# Patient Record
Sex: MALE | Race: WHITE
[De-identification: names, ages, dates, MRNs, and addresses within clinical notes are randomized per-mention and may not be internally consistent; named-entity substitution may affect disease eponyms.]

---

## 2023-10-08 ENCOUNTER — HOSPITAL ENCOUNTER (INPATIENT)
Dept: HOSPITAL 101 - ER | Age: 63
LOS: 1 days | Discharge: ANOTHER HOSPITAL | DRG: 208 | End: 2023-10-09
Payer: COMMERCIAL

## 2023-10-08 VITALS — OXYGEN SATURATION: 67 %

## 2023-10-08 VITALS — HEART RATE: 106 BPM | RESPIRATION RATE: 42 BRPM | OXYGEN SATURATION: 90 %

## 2023-10-08 VITALS — RESPIRATION RATE: 19 BRPM | HEART RATE: 103 BPM | OXYGEN SATURATION: 93 %

## 2023-10-08 VITALS
DIASTOLIC BLOOD PRESSURE: 88 MMHG | SYSTOLIC BLOOD PRESSURE: 143 MMHG | HEART RATE: 91 BPM | OXYGEN SATURATION: 89 % | RESPIRATION RATE: 27 BRPM

## 2023-10-08 VITALS — OXYGEN SATURATION: 87 % | RESPIRATION RATE: 29 BRPM | HEART RATE: 93 BPM

## 2023-10-08 VITALS — HEART RATE: 109 BPM | RESPIRATION RATE: 14 BRPM | OXYGEN SATURATION: 90 %

## 2023-10-08 VITALS
OXYGEN SATURATION: 92 % | DIASTOLIC BLOOD PRESSURE: 109 MMHG | RESPIRATION RATE: 22 BRPM | HEART RATE: 107 BPM | SYSTOLIC BLOOD PRESSURE: 160 MMHG

## 2023-10-08 VITALS
HEART RATE: 105 BPM | DIASTOLIC BLOOD PRESSURE: 128 MMHG | OXYGEN SATURATION: 95 % | SYSTOLIC BLOOD PRESSURE: 172 MMHG | RESPIRATION RATE: 28 BRPM

## 2023-10-08 VITALS — HEART RATE: 88 BPM | OXYGEN SATURATION: 91 %

## 2023-10-08 VITALS — HEART RATE: 92 BPM | OXYGEN SATURATION: 91 % | RESPIRATION RATE: 16 BRPM

## 2023-10-08 VITALS — HEART RATE: 102 BPM | RESPIRATION RATE: 42 BRPM

## 2023-10-08 VITALS
SYSTOLIC BLOOD PRESSURE: 181 MMHG | RESPIRATION RATE: 13 BRPM | HEART RATE: 110 BPM | DIASTOLIC BLOOD PRESSURE: 123 MMHG | OXYGEN SATURATION: 94 %

## 2023-10-08 VITALS — HEART RATE: 109 BPM | RESPIRATION RATE: 21 BRPM | OXYGEN SATURATION: 97 %

## 2023-10-08 VITALS — OXYGEN SATURATION: 98 % | HEART RATE: 112 BPM | RESPIRATION RATE: 19 BRPM

## 2023-10-08 VITALS — SYSTOLIC BLOOD PRESSURE: 184 MMHG | HEART RATE: 109 BPM | DIASTOLIC BLOOD PRESSURE: 118 MMHG | RESPIRATION RATE: 9 BRPM

## 2023-10-08 VITALS — RESPIRATION RATE: 17 BRPM | OXYGEN SATURATION: 89 % | HEART RATE: 107 BPM

## 2023-10-08 VITALS — HEART RATE: 107 BPM | RESPIRATION RATE: 13 BRPM

## 2023-10-08 VITALS — OXYGEN SATURATION: 91 % | RESPIRATION RATE: 14 BRPM | HEART RATE: 106 BPM

## 2023-10-08 VITALS — RESPIRATION RATE: 27 BRPM | HEART RATE: 95 BPM | OXYGEN SATURATION: 91 %

## 2023-10-08 VITALS
DIASTOLIC BLOOD PRESSURE: 105 MMHG | RESPIRATION RATE: 12 BRPM | OXYGEN SATURATION: 94 % | HEART RATE: 100 BPM | SYSTOLIC BLOOD PRESSURE: 146 MMHG

## 2023-10-08 VITALS — OXYGEN SATURATION: 91 %

## 2023-10-08 VITALS — HEART RATE: 106 BPM | RESPIRATION RATE: 26 BRPM | OXYGEN SATURATION: 89 %

## 2023-10-08 VITALS — HEART RATE: 98 BPM | RESPIRATION RATE: 22 BRPM | OXYGEN SATURATION: 95 %

## 2023-10-08 VITALS
RESPIRATION RATE: 40 BRPM | SYSTOLIC BLOOD PRESSURE: 179 MMHG | OXYGEN SATURATION: 97 % | HEART RATE: 108 BPM | DIASTOLIC BLOOD PRESSURE: 123 MMHG

## 2023-10-08 VITALS
DIASTOLIC BLOOD PRESSURE: 108 MMHG | SYSTOLIC BLOOD PRESSURE: 178 MMHG | HEART RATE: 107 BPM | RESPIRATION RATE: 15 BRPM | OXYGEN SATURATION: 80 %

## 2023-10-08 VITALS — RESPIRATION RATE: 46 BRPM | HEART RATE: 108 BPM | OXYGEN SATURATION: 91 %

## 2023-10-08 VITALS — RESPIRATION RATE: 42 BRPM | HEART RATE: 105 BPM

## 2023-10-08 VITALS — RESPIRATION RATE: 24 BRPM | OXYGEN SATURATION: 90 % | HEART RATE: 94 BPM

## 2023-10-08 VITALS
OXYGEN SATURATION: 91 % | SYSTOLIC BLOOD PRESSURE: 156 MMHG | HEART RATE: 111 BPM | RESPIRATION RATE: 36 BRPM | DIASTOLIC BLOOD PRESSURE: 106 MMHG

## 2023-10-08 VITALS — OXYGEN SATURATION: 91 % | HEART RATE: 109 BPM

## 2023-10-08 VITALS — HEART RATE: 95 BPM | OXYGEN SATURATION: 95 %

## 2023-10-08 VITALS — HEART RATE: 109 BPM | RESPIRATION RATE: 32 BRPM

## 2023-10-08 VITALS — HEART RATE: 90 BPM | OXYGEN SATURATION: 90 % | RESPIRATION RATE: 28 BRPM

## 2023-10-08 VITALS
DIASTOLIC BLOOD PRESSURE: 98 MMHG | OXYGEN SATURATION: 84 % | RESPIRATION RATE: 50 BRPM | SYSTOLIC BLOOD PRESSURE: 163 MMHG | HEART RATE: 106 BPM

## 2023-10-08 VITALS — SYSTOLIC BLOOD PRESSURE: 149 MMHG | OXYGEN SATURATION: 69 % | DIASTOLIC BLOOD PRESSURE: 106 MMHG

## 2023-10-08 VITALS
DIASTOLIC BLOOD PRESSURE: 99 MMHG | HEART RATE: 94 BPM | SYSTOLIC BLOOD PRESSURE: 157 MMHG | TEMPERATURE: 98.7 F | RESPIRATION RATE: 14 BRPM | OXYGEN SATURATION: 96 %

## 2023-10-08 VITALS
SYSTOLIC BLOOD PRESSURE: 176 MMHG | OXYGEN SATURATION: 89 % | HEART RATE: 104 BPM | RESPIRATION RATE: 29 BRPM | DIASTOLIC BLOOD PRESSURE: 111 MMHG

## 2023-10-08 VITALS — OXYGEN SATURATION: 88 % | RESPIRATION RATE: 47 BRPM | HEART RATE: 109 BPM

## 2023-10-08 VITALS — HEART RATE: 103 BPM | RESPIRATION RATE: 24 BRPM

## 2023-10-08 VITALS
RESPIRATION RATE: 44 BRPM | HEART RATE: 108 BPM | SYSTOLIC BLOOD PRESSURE: 161 MMHG | DIASTOLIC BLOOD PRESSURE: 115 MMHG | OXYGEN SATURATION: 83 %

## 2023-10-08 VITALS — DIASTOLIC BLOOD PRESSURE: 108 MMHG | RESPIRATION RATE: 29 BRPM | HEART RATE: 104 BPM | SYSTOLIC BLOOD PRESSURE: 179 MMHG

## 2023-10-08 VITALS — HEART RATE: 109 BPM | RESPIRATION RATE: 10 BRPM | OXYGEN SATURATION: 82 %

## 2023-10-08 VITALS
SYSTOLIC BLOOD PRESSURE: 135 MMHG | OXYGEN SATURATION: 89 % | RESPIRATION RATE: 27 BRPM | HEART RATE: 94 BPM | DIASTOLIC BLOOD PRESSURE: 88 MMHG

## 2023-10-08 VITALS
OXYGEN SATURATION: 89 % | RESPIRATION RATE: 30 BRPM | SYSTOLIC BLOOD PRESSURE: 177 MMHG | DIASTOLIC BLOOD PRESSURE: 108 MMHG | HEART RATE: 107 BPM

## 2023-10-08 VITALS — OXYGEN SATURATION: 89 % | RESPIRATION RATE: 30 BRPM | HEART RATE: 91 BPM

## 2023-10-08 VITALS — HEART RATE: 89 BPM | OXYGEN SATURATION: 91 % | RESPIRATION RATE: 24 BRPM

## 2023-10-08 VITALS — OXYGEN SATURATION: 90 % | HEART RATE: 107 BPM | RESPIRATION RATE: 30 BRPM

## 2023-10-08 VITALS — HEART RATE: 104 BPM | OXYGEN SATURATION: 93 % | RESPIRATION RATE: 16 BRPM

## 2023-10-08 VITALS
SYSTOLIC BLOOD PRESSURE: 173 MMHG | HEART RATE: 107 BPM | OXYGEN SATURATION: 92 % | RESPIRATION RATE: 23 BRPM | DIASTOLIC BLOOD PRESSURE: 107 MMHG

## 2023-10-08 VITALS
SYSTOLIC BLOOD PRESSURE: 149 MMHG | DIASTOLIC BLOOD PRESSURE: 106 MMHG | OXYGEN SATURATION: 68 % | RESPIRATION RATE: 20 BRPM | HEART RATE: 114 BPM

## 2023-10-08 VITALS
DIASTOLIC BLOOD PRESSURE: 119 MMHG | SYSTOLIC BLOOD PRESSURE: 174 MMHG | RESPIRATION RATE: 36 BRPM | HEART RATE: 107 BPM | OXYGEN SATURATION: 89 %

## 2023-10-08 VITALS — RESPIRATION RATE: 12 BRPM | OXYGEN SATURATION: 95 % | HEART RATE: 96 BPM

## 2023-10-08 VITALS
OXYGEN SATURATION: 92 % | RESPIRATION RATE: 27 BRPM | DIASTOLIC BLOOD PRESSURE: 102 MMHG | SYSTOLIC BLOOD PRESSURE: 151 MMHG | HEART RATE: 108 BPM

## 2023-10-08 VITALS
OXYGEN SATURATION: 92 % | RESPIRATION RATE: 32 BRPM | DIASTOLIC BLOOD PRESSURE: 121 MMHG | HEART RATE: 109 BPM | SYSTOLIC BLOOD PRESSURE: 189 MMHG

## 2023-10-08 VITALS — OXYGEN SATURATION: 68 %

## 2023-10-08 VITALS — RESPIRATION RATE: 32 BRPM | OXYGEN SATURATION: 93 % | HEART RATE: 107 BPM

## 2023-10-08 VITALS — RESPIRATION RATE: 27 BRPM | OXYGEN SATURATION: 92 % | HEART RATE: 105 BPM

## 2023-10-08 VITALS — DIASTOLIC BLOOD PRESSURE: 102 MMHG | SYSTOLIC BLOOD PRESSURE: 166 MMHG | RESPIRATION RATE: 32 BRPM | HEART RATE: 103 BPM

## 2023-10-08 VITALS — RESPIRATION RATE: 32 BRPM | HEART RATE: 92 BPM | OXYGEN SATURATION: 89 %

## 2023-10-08 VITALS — BODY MASS INDEX: 31.2 KG/M2 | BODY MASS INDEX: 31.6 KG/M2

## 2023-10-08 VITALS
RESPIRATION RATE: 16 BRPM | DIASTOLIC BLOOD PRESSURE: 111 MMHG | HEART RATE: 107 BPM | SYSTOLIC BLOOD PRESSURE: 160 MMHG | OXYGEN SATURATION: 89 %

## 2023-10-08 VITALS
SYSTOLIC BLOOD PRESSURE: 169 MMHG | HEART RATE: 105 BPM | RESPIRATION RATE: 35 BRPM | DIASTOLIC BLOOD PRESSURE: 104 MMHG | OXYGEN SATURATION: 85 %

## 2023-10-08 DIAGNOSIS — J44.1: Primary | ICD-10-CM

## 2023-10-08 DIAGNOSIS — Z88.0: ICD-10-CM

## 2023-10-08 DIAGNOSIS — F17.200: ICD-10-CM

## 2023-10-08 DIAGNOSIS — J96.02: ICD-10-CM

## 2023-10-08 DIAGNOSIS — E87.1: ICD-10-CM

## 2023-10-08 DIAGNOSIS — J96.91: ICD-10-CM

## 2023-10-08 DIAGNOSIS — R79.89: ICD-10-CM

## 2023-10-08 LAB
ADD MANUAL DIFF: NO
ALANINE AMINOTRANSFERASE: 66 U/L (ref 16–63)
ALBUMIN GLOBULIN RATIO: 0.9
ALBUMIN LEVEL: 3.7 G/DL (ref 3.4–5)
ALKALINE PHOSPHATASE: 74 U/L (ref 46–116)
ALLEN TEST: POSITIVE
ANION GAP: 8.6
ASPARTATE AMINO TRANSFERASE: 64 U/L (ref 15–37)
BLOOD UREA NITROGEN: 33 MG/DL (ref 7–18)
CALCIUM: 8.5 MG/DL (ref 8.5–10.1)
CARBON DIOXIDE: 33.2 MMOL/L (ref 21–32)
CAST SEEN?: (no result) #/LPF
CHLORIDE: 84 MMOL/L (ref 98–107)
CO2 BLD-SCNC: 33.2 MMOL/L (ref 21–32)
CO2 SERPLBLD-SCNC: 88.4 MMHG (ref 35–45)
ESTIMATED GFR (AFRICAN AMERICA: >60 (ref 60–?)
ESTIMATED GFR (NON-AFRICAN AME: 59 (ref 60–?)
GAS PNL BLDA: 88.4 MMHG (ref 35–45)
GLOBULIN: 4 G/DL
GLUCOSE BLD-MCNC: 128 MG/DL (ref 74–106)
GLUCOSE URINE UA: NEGATIVE MG/DL
HCO3 ABG: 33.9 MMOL/L (ref 22–26)
HCO3 STD BLDA-SCNC: 33.9 MMOL/L (ref 22–26)
HCT VFR BLD CALC: 57.4 % (ref 42–54)
HEMATOCRIT: 57.4 % (ref 42–54)
HEMOGLOBIN: 18.6 G/DL (ref 14–18)
IMMATURE GRANULOCYTES ABS AUTO: 0.02 10^3/UL (ref 0–0.03)
IMMATURE GRANULOCYTES PCT AUTO: 0.4 % (ref 0–0.5)
INR: 1.14
LACTATE/LACTIC ACID: 1.2 MMOL/L (ref 0.4–2)
LACTATE/LACTIC ACID: 3 MMOL/L (ref 0.4–2)
LITERS PER MINUTE: 4
LYMPHOCYTES  ABSOLUTE AUTO: 1 10^3/UL (ref 1.2–3.8)
MCV RBC: 100.9 FL (ref 80–94)
MEAN CORPUSCULAR HEMOGLOBIN: 32.7 PG (ref 25.9–34)
MEAN CORPUSCULAR HGB CONC: 32.4 G/DL (ref 29.9–35.2)
MEAN CORPUSCULAR VOLUME: 100.9 FL (ref 80–94)
O2 MODE: (no result)
PARTIAL THROMBOPLASTIN TIME: 30.9 SEC (ref 22.3–36.2)
PLATELET # BLD: 210 10^3/UL (ref 150–450)
PLATELET COUNT: 210 10^3/UL (ref 150–450)
PO2 BLDA: 93.1 MMHG (ref 80–100)
POTASSIUM SERPLBLD-SCNC: 4.8 MMOL/L (ref 3.5–5.1)
POTASSIUM: 4.8 MMOL/L (ref 3.5–5.1)
PROTHROMBIN TIME: 12 SEC (ref 9–11.6)
PUNCTURE SITE: (no result)
RED BLOOD COUNT: 5.69 10^6/UL (ref 4.7–6.1)
SAO2 % BLDA: 96 %
SARS-COV-2 AG: NEGATIVE
SODIUM BLD-SCNC: 121 MMOL/L (ref 136–145)
SODIUM: 121 MMOL/L (ref 136–145)
TOTAL PROTEIN: 7.7 G/DL (ref 6.4–8.2)
URINE CULTURE INDICATED: YES
WBC # BLD: 5.6 10^3/UL (ref 4–11)
WHITE BLOOD COUNT: 5.6 10^3/UL (ref 4–11)

## 2023-10-08 PROCEDURE — 94002 VENT MGMT INPAT INIT DAY: CPT

## 2023-10-08 PROCEDURE — 87804 INFLUENZA ASSAY W/OPTIC: CPT

## 2023-10-08 PROCEDURE — 36415 COLL VENOUS BLD VENIPUNCTURE: CPT

## 2023-10-08 PROCEDURE — 81001 URINALYSIS AUTO W/SCOPE: CPT

## 2023-10-08 PROCEDURE — 93005 ELECTROCARDIOGRAM TRACING: CPT

## 2023-10-08 PROCEDURE — 87086 URINE CULTURE/COLONY COUNT: CPT

## 2023-10-08 PROCEDURE — 87205 SMEAR GRAM STAIN: CPT

## 2023-10-08 PROCEDURE — 96375 TX/PRO/DX INJ NEW DRUG ADDON: CPT

## 2023-10-08 PROCEDURE — 96366 THER/PROPH/DIAG IV INF ADDON: CPT

## 2023-10-08 PROCEDURE — 84484 ASSAY OF TROPONIN QUANT: CPT

## 2023-10-08 PROCEDURE — 87635 SARS-COV-2 COVID-19 AMP PRB: CPT

## 2023-10-08 PROCEDURE — 96376 TX/PRO/DX INJ SAME DRUG ADON: CPT

## 2023-10-08 PROCEDURE — 87811 SARS-COV-2 COVID19 W/OPTIC: CPT

## 2023-10-08 PROCEDURE — 71045 X-RAY EXAM CHEST 1 VIEW: CPT

## 2023-10-08 PROCEDURE — U0003 INFECTIOUS AGENT DETECTION BY NUCLEIC ACID (DNA OR RNA); SEVERE ACUTE RESPIRATORY SYNDROME CORONAVIRUS 2 (SARS-COV-2) (CORONAVIRUS DISEASE [COVID-19]), AMPLIFIED PROBE TECHNIQUE, MAKING USE OF HIGH THROUGHPUT TECHNOLOGIES AS DESCRIBED BY CMS-2020-01-R: HCPCS

## 2023-10-08 PROCEDURE — 94640 AIRWAY INHALATION TREATMENT: CPT

## 2023-10-08 PROCEDURE — 84300 ASSAY OF URINE SODIUM: CPT

## 2023-10-08 PROCEDURE — 80048 BASIC METABOLIC PNL TOTAL CA: CPT

## 2023-10-08 PROCEDURE — 83605 ASSAY OF LACTIC ACID: CPT

## 2023-10-08 PROCEDURE — 51702 INSERT TEMP BLADDER CATH: CPT

## 2023-10-08 PROCEDURE — 83880 ASSAY OF NATRIURETIC PEPTIDE: CPT

## 2023-10-08 PROCEDURE — 83935 ASSAY OF URINE OSMOLALITY: CPT

## 2023-10-08 PROCEDURE — 82805 BLOOD GASES W/O2 SATURATION: CPT

## 2023-10-08 PROCEDURE — 96365 THER/PROPH/DIAG IV INF INIT: CPT

## 2023-10-08 PROCEDURE — 85378 FIBRIN DEGRADE SEMIQUANT: CPT

## 2023-10-08 PROCEDURE — 85610 PROTHROMBIN TIME: CPT

## 2023-10-08 PROCEDURE — 71275 CT ANGIOGRAPHY CHEST: CPT

## 2023-10-08 PROCEDURE — 36600 WITHDRAWAL OF ARTERIAL BLOOD: CPT

## 2023-10-08 PROCEDURE — 87040 BLOOD CULTURE FOR BACTERIA: CPT

## 2023-10-08 PROCEDURE — 85025 COMPLETE CBC W/AUTO DIFF WBC: CPT

## 2023-10-08 PROCEDURE — 94660 CPAP INITIATION&MGMT: CPT

## 2023-10-08 PROCEDURE — 83930 ASSAY OF BLOOD OSMOLALITY: CPT

## 2023-10-08 PROCEDURE — 96368 THER/DIAG CONCURRENT INF: CPT

## 2023-10-08 PROCEDURE — 87070 CULTURE OTHR SPECIMN AEROBIC: CPT

## 2023-10-08 PROCEDURE — 80053 COMPREHEN METABOLIC PANEL: CPT

## 2023-10-08 PROCEDURE — 99285 EMERGENCY DEPT VISIT HI MDM: CPT

## 2023-10-08 PROCEDURE — 85730 THROMBOPLASTIN TIME PARTIAL: CPT

## 2023-10-08 PROCEDURE — 84295 ASSAY OF SERUM SODIUM: CPT

## 2023-10-08 PROCEDURE — 96367 TX/PROPH/DG ADDL SEQ IV INF: CPT

## 2023-10-08 RX ADMIN — LORAZEPAM 0.5 MG: 2 INJECTION INTRAMUSCULAR; INTRAVENOUS at 21:15

## 2023-10-08 RX ADMIN — AZITHROMYCIN 250 MG: 500 INJECTION, POWDER, LYOPHILIZED, FOR SOLUTION INTRAVENOUS at 19:32

## 2023-10-08 RX ADMIN — FLUMAZENIL 0.5 MG: 0.1 INJECTION, SOLUTION INTRAVENOUS at 22:32

## 2023-10-08 RX ADMIN — SODIUM CHLORIDE 999 ML: 900 INJECTION, SOLUTION INTRAVENOUS at 18:35

## 2023-10-08 RX ADMIN — ALBUTEROL SULFATE 2.5 MG: 2.5 SOLUTION RESPIRATORY (INHALATION) at 17:37

## 2023-10-08 RX ADMIN — IPRATROPIUM BROMIDE AND ALBUTEROL SULFATE 3 ML: .5; 2.5 SOLUTION RESPIRATORY (INHALATION) at 17:37

## 2023-10-08 RX ADMIN — Medication 1 MG: at 19:15

## 2023-10-08 RX ADMIN — METHYLPREDNISOLONE SODIUM SUCCINATE 125 MG: 125 INJECTION, POWDER, FOR SOLUTION INTRAMUSCULAR; INTRAVENOUS at 17:47

## 2023-10-08 NOTE — CT_ITS
The [address] [address] 
     [phone] 
  
  
Patient Name: 
EDGAR REYES 
  
MRN: [id number]    YOB: 1960    Sex: M 
Assigned Patient Location: ER 
Current Patient Location:  
Accession/Order Number: [id number] 
Exam Date: 10/08/2023  19:58    Report Date: 10/08/2023  21:29 
  
At the request of: 
RIVAS RODRIGEZ   
  
Procedure:  CT angio chest 
  
EXAM: CT angio chest 
  
TECHNIQUE: CT angiogram with contrast performed of the chest including multi  
planar reformatted images and maximum intensity projection images. 3-D volume  
rendering created. Dose reduction techniques were achieved by using automated  
exposure control and/or adjustment of mA and/or kV according to patient size  
and/or use of iterative reconstruction technique.  
  
HISTORY: r/o pE elevated d-dimer  
  
COMPARISON: None.  
  
______________________ 
  
FINDINGS: 
  
Neck and Axilla: No lower neck or axillary lymphadenopathy. 
  
Mediastinum and [place]: No hilar or mediastinal lymphadenopathy. The esophagus  
is  
grossly unremarkable without dilatation or gross mass lesion. 
  
Heart and Major Vessels: The heart appears unremarkable for size without  
pericardial effusion. The aorta and central pulmonary arteries are  
unremarkable  
for size.Evaluation of the pulmonary embolism is limited by breathing motion  
artifact. There is no convincing evidence for acute pulmonary embolism. 
  
Lung Fields: The lungs are clear of acute infiltrate or mass. 
  
Pleural Spaces: No significant pleural effusion. No pneumothorax. 
  
Upper Abdomen: No acute abnormality identified. 
  
Chest Wall: No acute abnormality. 
  
_________________________ 
  
ORDER #: [id number] CT/CT angio chest  
IMPRESSION:   
   
No evidence for acute pulmonary embolism. Evaluation somewhat limited for   
distal pulmonary arteries due to motion artifact.  
   
No acute pulmonary consolidation  
   
   
Electronically authenticated by: NAS GARZA   Date: 10/08/2023  21:29

## 2023-10-08 NOTE — ED.SOB1
HPI - SOB/Dyspnea
General
Chief Complaint: Shortness of Breath/Dyspnea
Stated Complaint: SOB
Time Seen by Provider: 10/08/23 17:05
Source: patient
Mode of arrival: Wheelchair
Limitations: no limitations
History of Present Illness
HPI Narrative: 
patient is a 62-year-old male presents to the Emergency Room with his daughter and brother for evaluation of shortness of breath. Patient recently retired, had a family member die eight weeks ago unexpectedly and has been noticing a decline in his 
health. Patient reports a 30 pound weight loss, increased shortness of breath with activity. He was previously on a inhaler for chronic obstructive pulmonary disease but has been out of this for some time and still smokes more than one pack per day. 
Patient presents with profound fatigue and shortness of breath on exertion. He denies any current pain or discomfort. Daughter at bedside notes some confusion and home pulse oximetry read very low compared  to her own which required a lot of talking 
the patient into coming in being seen.

patient appears hypoxic with increased work of breathing, dusky cool skin..patient admits to having some sinus congestion within the past few weeks.
MD elicited complaint: shortness of breath and cough
Pertinent past history: COPD
Context: recent illness
Severity: mild
Exacerbating factors: movement (any activity) and stress
Relieving factors: nothing
Known history of: COPD
Related Data
Home Medications

 Medication  Instructions  Recorded  Confirmed
No Known Home Medications  10/08/23 10/08/23


Allergies

Allergy/AdvReac Type Severity Reaction Status Date / Time
Penicillins Allergy Unknown  Verified 10/08/23 17:25



Review of Systems
ROS  
 Constitutional Reports: change in weight and fatigue; Denies: fever or chills   
 Eyes Denies: change in vision, blurry vision, blind spots or light sensitivity   
 Ears, nose, mouth, and throat Denies: throat pain or neck pain   
 Cardiovascular Reports: edema, shortness of breath when lying down and bluish discoloration of hands/feet; Denies: chest pain or palpitations   
 Respiratory Reports: shortness of breath, cough and wheezing; Denies: pain on inspiration   
 Gastrointestinal Denies: abdominal pain, nausea or vomiting   
 Musculoskeletal Denies: back pain or neck pain   
 Integumentary/Breast Denies: rash or itching   
 Neurological Reports: confusion; Denies: headache   
 Psychiatric Denies: anxiety or mood swings   
 Endocrine Denies: excessive urination   

[facility]
Social History (Updated 10/08/23 @ 23:52 by Lesa Mcarthur)
Smoking status:  Heavy tobacco smoker 
Do you think of yourself as:  straight/heterosexual 
Gender Identity:  male 



Exam
Narrative
Exam Narrative: 
Nurses notes and vital signs reviewed and patient is hypoxic

General:  The patient appears well and in no apparent distress.  Patient is resting comfortably on cart.
Skin:  cool pale dusky appearance of skin, bluish tint to hands and feet and nose with telangectasias and notable tobacco staining to his mustache
Head:  Normocephalic, atraumatic
Neck:  Supple, trachea mid-line, no tenderness, no lymphadenopathy
Eye:  Pupils are equal, round and reactive to light, EOMI
Ears, Nose, Mouth, and Throat:  TM are clear, normal light reflex, oral mucosa is moist, no posterior oropharynx erythema or hypertrophy, uvula is mid-line
Cardiovascular:  tachycardic
Respiratory: no accessory muscle use diffuse expiratory and has notable inspiratory and expiratory wheezing.
Chest Wall:  no tenderness
Back:  non-tender, no CVA tenderness
Musculoskeletal:  normal ROM, no tenderness, minimal edema in the lower legs.
GI:  Normal bowel sounds, no tenderness to palpation, no masses appreciated.  No rebound, guarding, or rigidity noted.
Neurological:  A&O x4, recalls recent history of caring for family and death of family member 8 wks ago.  
Psychiatric:  Cooperative
Constitutional
Vital Signs, click to edit/add: 

Last Vital Signs

Temp  98.7 F   10/08/23 23:02
Pulse  72   10/09/23 04:30
Resp  16   10/09/23 04:30
BP  110/79   10/09/23 04:30
Pulse Ox  94 L  10/09/23 04:30
O2 Del Method  BIPAP  10/08/23 23:13
O2 Flow Rate  4   10/08/23 17:39
FiO2  40   10/09/23 04:30




Course
Vital Signs
Vital signs: 

Vital Signs

Pulse Rate  114 H  10/08/23 17:08
Respiratory Rate  20   10/08/23 17:08
Blood Pressure  149/106 H  10/08/23 17:08
Pulse Oximetry  68 L  10/08/23 17:08
Oxygen Delivery Method  Room Air  10/08/23 17:08



Temperature  98.7 F   10/08/23 23:02
Pulse Rate  72   10/09/23 04:30
Respiratory Rate  16   10/09/23 04:30
Blood Pressure  110/79   10/09/23 04:30
Pulse Oximetry  94 L  10/09/23 04:30
Oxygen Delivery Method  BIPAP  10/08/23 23:13
Oxygen Delivery Flow Rate  4   10/08/23 17:39
Fraction of Inspired Oxygen  40   10/09/23 04:30




MDM - SOB/Dyspnea
MDM Narrative
Medical decision making narrative: 
clinical presentation concerning for acute on chronic chronic obstructive pulmonary disease exacerbation. Patient not taking home inhaler, chronic smoking abuse, patient has had significant weight loss. Since retiring patient's health has 
significantly declined... Patient given DuoNeb and albuterol. Solu-Medrol 125 mg IV. Patient placed on oxygen 4 L/m nasal cannula. Patient noting improvement in his breathing after treatments. Pulse oximetry stayed at 90%, compared to 64% on 
arrival. Patient had chest x-ray without evidence of infiltrate or mass. We discussed his laboratory studies noted for ABG pH is 7.193, elevated lactic 3.0, PCO2 was 80.4. He was hyponatremic at one twenty-one, and troponin was elevated at 149.8 
along with BNP of eleven thousand four twenty-five. Patient does not appear to be fluid retaining and was given a 1 L IV fluid bolus for his hyponatremia. Patient adamant about not being admitted to the hospital, bedside conversation with Dr. cabrera 
discussing today's findings with concern of death if patient signs out against medical advice. The patient agrees to stay at this hospital facility but would not accept a transfer to any other facility at this time. He declines transfer to tertiary 
facility. This case will be discussed with the hospitalist for admission and at some point likely pulmonary consult given the chronicity chronic obstructive pulmonary disease, untreated and tobacco abuse contributing to his symptoms with acute 
exacerbation.

Hospitalist paged by Dr. Cabrera, Dr. Thurman will discuss admission
Lab Data
Attestation: I reviewed the patient's lab results.
Labs: 

Lab Results

  10/08/23 10/08/23 10/08/23 Range/Units
  17:25 17:40 17:58 
WBC  5.6    (4.0-11.0)  10^3/uL
RBC  5.69    (4.70-6.10)  10^6/uL
Hgb  18.6 H    (14.0-18.0)  g/dL
Hct  57.4 H    (42.0-54.0)  %
MCV  100.9 H    (80.0-94.0)  fL
MCH  32.7    (25.9-34.0)  pg
MCHC  32.4    (29.9-35.2)  g/dL
RDW  13.3    (11.0-15.0)  %
Plt Count  210    (150-450)  10^3/uL
MPV  9.9    (9.5-13.5)  fL
Neut % (Auto)  65.1    (43.0-75.0)  %
Lymph % (Auto)  17.1 L    (20.5-60.0)  %
Mono % (Auto)  14.2 H    (1.7-12.0)  %
Eos % (Auto)  2.7    (0.9-7.0)  %
Baso % (Auto)  0.5    (0.2-2.0)  %
Neut # (Auto)  3.6    (1.4-6.5)  10^3/uL
Lymph # (Auto)  1.0 L    (1.2-3.8)  10^3/uL
Mono # (Auto)  0.8    (0.3-0.8)  10^3/uL
Eos # (Auto)  0.2    (0.0-0.7)  10^3/uL
Baso # (Auto)  0.0    (0.0-0.1)  10^3/uL
Abs Immat Gran (auto)  0.02    (0.00-0.03)  10^3/uL
Imm/Tot Granulo (auto)  0.4    (0.0-0.5)  %
PT  12.0 H    (9.0-11.6)  sec
INR  1.14    
APTT  30.9    (22.3-36.2)  sec
D-Dimer  2.49 H*    (<=0.59)  mg/L FEU
Puncture Site    Rr  
ABG pH    7.193 L*  (7.350-7.450)  
ABG pCO2    88.4 H*  (35.0-45.0)  mmHg
ABG pO2    93.1  (80.0-100.0)  mmHg
ABG HCO3    33.9 H  (22.0-26.0)  mmol/L
ABG O2 Saturation    96.0  %
ABG Base Excess    5.8 H  (-2.0-2.0)  mmol/L
Oliverio Test    Positive  (POSITIVE)  
O2 Liters/Min    4  
Sodium  121 L*    (136-145)  mmol/L
Potassium  4.8    (3.5-5.1)  mmol/L
Chloride  84 L*    ()  mmol/L
Carbon Dioxide  33.2 H    (21.0-32.0)  mmol/L
Anion Gap  8.6    
BUN  33.0 H    (7.0-18.0)  mg/dL
Creatinine  1.25    (0.70-1.30)  mg/dL
Est GFR ( Amer)  >60    (>=60)  
Est GFR (Non-Af Amer)  59 L    (>=60)  
BUN/Creatinine Ratio  26.4    
Glucose  128 H    ()  mg/dL
Lactate  3.0 H*    (0.4-2.0)  mmol/L
Calcium  8.5    (8.5-10.1)  mg/dL
Total Bilirubin  0.9    (0.2-1.0)  mg/dL
AST  64 H    (15-37)  U/L
ALT  66 H    (16-63)  U/L
Alkaline Phosphatase  74    ()  U/L
Troponin I High Sens  149.8 H*    (4.0-76.1)  pg/mL
NT-Pro-B Natriuret Pep  34772.0 H*    (<=900.0)  pg/mL
Total Protein  7.7    (6.4-8.2)  g/dL
Albumin  3.7    (3.4-5.0)  g/dL
Globulin  4.0    g/dL
Albumin/Globulin Ratio  0.9    
Urine Color     (YELLOW)  
Urine Clarity     (CLEAR)  
Urine pH     (5.0-9.0)  
Ur Specific Gravity     (1.005-1.025)  
Urine Protein     (NEG/TRACE)  mg/dL
Urine Glucose (UA)     (NEGATIVE)  mg/dL
Urine Ketones     (NEGATIVE)  mg/dL
Urine Occult Blood     (NEGATIVE)  
Urine Nitrite     (NEGATIVE)  
Urine Bilirubin     (NEGATIVE)  
Urine Urobilinogen     (0.2-1.0)  EU/dL
Ur Leukocyte Esterase     (NEGATIVE)  
Urine RBC     (0-2)  #/HPF
Urine WBC     (NONE SEEN)  #/HPF
Ur Squamous Epith Cells     (NONE/RARE)  #/LPF
Urine Crystals     (None Seen)  #/HPF
Urine Bacteria     (NONE SEEN)  #/HPF
Urine Casts     (NONE SEEN)  #/LPF
Hyaline Casts     
Fine Granular Casts     
Waxy Casts     
Urine Mucus     (NONE SEEN)  
Ur Culture Indicated?     
SARS-CoV-2 (PCR)   Negative   (NEGATIVE)  
Influenza Type A Ag   Negative   
Influenza Type B Ag   Negative   
SARS-CoV-2 RNA (SATINDER)   Not detected   (NOT DETECTE)  
POC Glucose     ()  mg/dL

  10/08/23 10/08/23 10/08/23 Range/Units
  19:20 19:23 20:25 
WBC     (4.0-11.0)  10^3/uL
RBC     (4.70-6.10)  10^6/uL
Hgb     (14.0-18.0)  g/dL
Hct     (42.0-54.0)  %
MCV     (80.0-94.0)  fL
MCH     (25.9-34.0)  pg
MCHC     (29.9-35.2)  g/dL
RDW     (11.0-15.0)  %
Plt Count     (150-450)  10^3/uL
MPV     (9.5-13.5)  fL
Neut % (Auto)     (43.0-75.0)  %
Lymph % (Auto)     (20.5-60.0)  %
Mono % (Auto)     (1.7-12.0)  %
Eos % (Auto)     (0.9-7.0)  %
Baso % (Auto)     (0.2-2.0)  %
Neut # (Auto)     (1.4-6.5)  10^3/uL
Lymph # (Auto)     (1.2-3.8)  10^3/uL
Mono # (Auto)     (0.3-0.8)  10^3/uL
Eos # (Auto)     (0.0-0.7)  10^3/uL
Baso # (Auto)     (0.0-0.1)  10^3/uL
Abs Immat Gran (auto)     (0.00-0.03)  10^3/uL
Imm/Tot Granulo (auto)     (0.0-0.5)  %
PT     (9.0-11.6)  sec
INR     
APTT     (22.3-36.2)  sec
D-Dimer     (<=0.59)  mg/L FEU
Puncture Site     
ABG pH     (7.350-7.450)  
ABG pCO2     (35.0-45.0)  mmHg
ABG pO2     (80.0-100.0)  mmHg
ABG HCO3     (22.0-26.0)  mmol/L
ABG O2 Saturation     %
ABG Base Excess     (-2.0-2.0)  mmol/L
Oliverio Test     (POSITIVE)  
O2 Liters/Min     
Sodium     (136-145)  mmol/L
Potassium     (3.5-5.1)  mmol/L
Chloride     ()  mmol/L
Carbon Dioxide     (21.0-32.0)  mmol/L
Anion Gap     
BUN     (7.0-18.0)  mg/dL
Creatinine     (0.70-1.30)  mg/dL
Est GFR ( Amer)     (>=60)  
Est GFR (Non-Af Amer)     (>=60)  
BUN/Creatinine Ratio     
Glucose     ()  mg/dL
Lactate    1.2  (0.4-2.0)  mmol/L
Calcium     (8.5-10.1)  mg/dL
Total Bilirubin     (0.2-1.0)  mg/dL
AST     (15-37)  U/L
ALT     (16-63)  U/L
Alkaline Phosphatase     ()  U/L
Troponin I High Sens  138.4 H*    (4.0-76.1)  pg/mL
NT-Pro-B Natriuret Pep     (<=900.0)  pg/mL
Total Protein     (6.4-8.2)  g/dL
Albumin     (3.4-5.0)  g/dL
Globulin     g/dL
Albumin/Globulin Ratio     
Urine Color     (YELLOW)  
Urine Clarity     (CLEAR)  
Urine pH     (5.0-9.0)  
Ur Specific Gravity     (1.005-1.025)  
Urine Protein     (NEG/TRACE)  mg/dL
Urine Glucose (UA)     (NEGATIVE)  mg/dL
Urine Ketones     (NEGATIVE)  mg/dL
Urine Occult Blood     (NEGATIVE)  
Urine Nitrite     (NEGATIVE)  
Urine Bilirubin     (NEGATIVE)  
Urine Urobilinogen     (0.2-1.0)  EU/dL
Ur Leukocyte Esterase     (NEGATIVE)  
Urine RBC     (0-2)  #/HPF
Urine WBC     (NONE SEEN)  #/HPF
Ur Squamous Epith Cells     (NONE/RARE)  #/LPF
Urine Crystals     (None Seen)  #/HPF
Urine Bacteria     (NONE SEEN)  #/HPF
Urine Casts     (NONE SEEN)  #/LPF
Hyaline Casts     
Fine Granular Casts     
Waxy Casts     
Urine Mucus     (NONE SEEN)  
Ur Culture Indicated?     
SARS-CoV-2 (PCR)     (NEGATIVE)  
Influenza Type A Ag     
Influenza Type B Ag     
SARS-CoV-2 RNA (SATINDER)     (NOT DETECTE)  
POC Glucose   126 H   ()  mg/dL

  10/08/23 Range/Units
  21:10 
WBC   (4.0-11.0)  10^3/uL
RBC   (4.70-6.10)  10^6/uL
Hgb   (14.0-18.0)  g/dL
Hct   (42.0-54.0)  %
MCV   (80.0-94.0)  fL
MCH   (25.9-34.0)  pg
MCHC   (29.9-35.2)  g/dL
RDW   (11.0-15.0)  %
Plt Count   (150-450)  10^3/uL
MPV   (9.5-13.5)  fL
Neut % (Auto)   (43.0-75.0)  %
Lymph % (Auto)   (20.5-60.0)  %
Mono % (Auto)   (1.7-12.0)  %
Eos % (Auto)   (0.9-7.0)  %
Baso % (Auto)   (0.2-2.0)  %
Neut # (Auto)   (1.4-6.5)  10^3/uL
Lymph # (Auto)   (1.2-3.8)  10^3/uL
Mono # (Auto)   (0.3-0.8)  10^3/uL
Eos # (Auto)   (0.0-0.7)  10^3/uL
Baso # (Auto)   (0.0-0.1)  10^3/uL
Abs Immat Gran (auto)   (0.00-0.03)  10^3/uL
Imm/Tot Granulo (auto)   (0.0-0.5)  %
PT   (9.0-11.6)  sec
INR   
APTT   (22.3-36.2)  sec
D-Dimer   (<=0.59)  mg/L FEU
Puncture Site   
ABG pH   (7.350-7.450)  
ABG pCO2   (35.0-45.0)  mmHg
ABG pO2   (80.0-100.0)  mmHg
ABG HCO3   (22.0-26.0)  mmol/L
ABG O2 Saturation   %
ABG Base Excess   (-2.0-2.0)  mmol/L
Oliverio Test   (POSITIVE)  
O2 Liters/Min   
Sodium   (136-145)  mmol/L
Potassium   (3.5-5.1)  mmol/L
Chloride   ()  mmol/L
Carbon Dioxide   (21.0-32.0)  mmol/L
Anion Gap   
BUN   (7.0-18.0)  mg/dL
Creatinine   (0.70-1.30)  mg/dL
Est GFR ( Amer)   (>=60)  
Est GFR (Non-Af Amer)   (>=60)  
BUN/Creatinine Ratio   
Glucose   ()  mg/dL
Lactate   (0.4-2.0)  mmol/L
Calcium   (8.5-10.1)  mg/dL
Total Bilirubin   (0.2-1.0)  mg/dL
AST   (15-37)  U/L
ALT   (16-63)  U/L
Alkaline Phosphatase   ()  U/L
Troponin I High Sens   (4.0-76.1)  pg/mL
NT-Pro-B Natriuret Pep   (<=900.0)  pg/mL
Total Protein   (6.4-8.2)  g/dL
Albumin   (3.4-5.0)  g/dL
Globulin   g/dL
Albumin/Globulin Ratio   
Urine Color  Yellow  (YELLOW)  
Urine Clarity  Clear  (CLEAR)  
Urine pH  5.5  (5.0-9.0)  
Ur Specific Gravity  >=1.030 A  (1.005-1.025)  
Urine Protein  >=300 A  (NEG/TRACE)  mg/dL
Urine Glucose (UA)  Negative  (NEGATIVE)  mg/dL
Urine Ketones  Negative  (NEGATIVE)  mg/dL
Urine Occult Blood  Moderate A  (NEGATIVE)  
Urine Nitrite  Negative  (NEGATIVE)  
Urine Bilirubin  Small A  (NEGATIVE)  
Urine Urobilinogen  1.0  (0.2-1.0)  EU/dL
Ur Leukocyte Esterase  Negative  (NEGATIVE)  
Urine RBC  0-2  (0-2)  #/HPF
Urine WBC  0-2 A  (NONE SEEN)  #/HPF
Ur Squamous Epith Cells  Few A  (NONE/RARE)  #/LPF
Urine Crystals  None seen  (None Seen)  #/HPF
Urine Bacteria  Large A  (NONE SEEN)  #/HPF
Urine Casts  Seen A  (NONE SEEN)  #/LPF
Hyaline Casts  Many  
Fine Granular Casts  Few  
Waxy Casts  Rare  
Urine Mucus  Moderate A  (NONE SEEN)  
Ur Culture Indicated?  Yes  
SARS-CoV-2 (PCR)   (NEGATIVE)  
Influenza Type A Ag   
Influenza Type B Ag   
SARS-CoV-2 RNA (SATINDER)   (NOT DETECTE)  
POC Glucose   ()  mg/dL



ABG Data
Attestation: I have reviewed the pertinent ABG results.
Imaging Data
Chest x-ray: 
      Attestation: I have reviewed the pertinent imaging results.
      Radiologist's impression: 
Patient Name:
EDGAR REYES
 
MRN: [id number]    YOB: 1960    Sex: M
Assigned Patient Location: ED.MAIN
Current Patient Location: ER
Accession/Order Number: [id number]
Exam Date: 10/08/2023  17:40    Report Date: 10/08/2023  17:56
 
At the request of:
RIVAS RODRIGEZ  
 
Procedure:  XR chest 1V
 
EXAMINATION: XR chest 1V 
 
HISTORY: Shortness of breath 
 
COMPARISON: Chest x-rays 8/10/2022 
 
TECHNIQUE: Portable chest
 
FINDINGS: 
 
The lung parenchyma is free of consolidation or infiltrate. No pneumothorax or 
 
pleural effusion. The cardiac, mediastinal and hilar contours are normal. The 
visualized osseous structures exhibit no gross abnormality.
 

ORDER #: [id number] XR/XR chest 1V
IMPRESSION: 
 
No acute cardiopulmonary abnormality. 
 
 
 
Electronically authenticated by: HARJIT MARIN   Date: 10/08/2023  17:56
ECG Data
Attestation: I personally reviewed and interpreted this ECG as follows:
Interpretation: 
EKG interpretation:  Emergency Department physician interpretation, sinus tachycardia, incomplete right bundle-branch block.no st elevation.  axis indeterminate. 


Discharge Plan
Discharge
Chief Complaint: Shortness of Breath/Dyspnea

Clinical Impression:
 Hyponatremia, Elevated troponin, Tobacco abuse, Hypoxia, Acute exacerbation of chronic obstructive pulmonary disease


Patient Disposition: Admitted As Inpatient

Time of Disposition Decision: 19:08

Condition: Fair

Discharge Date/Time: 10/08/23 22:49

## 2023-10-08 NOTE — ED_ITS
HPI - General Adult    
General    
Chief complaint: Shortness of Breath/Dyspnea    
Stated complaint: SOB    
Time Seen by Provider: 10/08/23 17:05    
Source: patient    
Mode of arrival: Wheelchair    
Limitations: no limitations    
History of Present Illness    
HPI narrative:     
please see full history and physical.    
Related Data    
                                    Allergies    
    
    
    
Allergy/AdvReac Type Severity Reaction Status Date / Time    
     
Penicillins Allergy Unknown  Verified 10/08/23 17:25    
    
    
    
    
PFSH    
PFSH    
Social History    
Smoking status:  Current every day smoker     
    
    
    
Exam    
Constitutional    
Vital Signs, click to edit/add:     
    
                                Last Vital Signs    
    
    
    
Pulse  94 H  10/08/23 22:15    
     
Resp  27 H  10/08/23 22:15    
     
BP  135/88   10/08/23 22:15    
     
Pulse Ox  89 L  10/08/23 22:15    
     
O2 Del Method  Nasal Cannula  10/08/23 17:39    
     
O2 Flow Rate  4   10/08/23 17:39    
     
FiO2  50   10/08/23 22:00    
    
    
    
    
    
Course    
Vital Signs    
Vital signs:     
    
                                   Vital Signs    
    
    
    
Pulse Rate  114 H  10/08/23 17:08    
     
Respiratory Rate  20   10/08/23 17:08    
     
Blood Pressure  149/106 H  10/08/23 17:08    
     
Pulse Oximetry  68 L  10/08/23 17:08    
     
Oxygen Delivery Method  Room Air  10/08/23 17:08    
    
    
                                            
    
    
    
Pulse Rate  94 H  10/08/23 22:15    
     
Respiratory Rate  27 H  10/08/23 22:15    
     
Blood Pressure  135/88   10/08/23 22:15    
     
Pulse Oximetry  89 L  10/08/23 22:15    
     
Oxygen Delivery Method  Nasal Cannula  10/08/23 17:39    
     
Oxygen Delivery Flow Rate  4   10/08/23 17:39    
     
Fraction of Inspired Oxygen  50   10/08/23 22:00    
    
    
    
    
    
Medical Decision Making    
[facility] Narrative    
Medical decision making narrative:     
The patient presents with chronic obstructive pulmonary disease exacerbation. He  
appears to be a chronic CO2 retainer and is noncompliant. The patient became   
somewhat drowsy and was placed on BiPAP and is being admitted to the ICU.  He   
continues to be hemodynamically stable with an O2 sat of ninety percent.  Repeat  
troponin has decreased.    
Differential Diagnosis    
Differential Diagnosis: pneumonia, chronic obstructive pulmonary disease   
exacerbation    
Lab Data    
Lab results reviewed: Yes I reviewed the patient's lab results    
Labs:     
    
                                   Lab Results    
    
    
    
  10/08/23 10/08/23 10/08/23 Range/Units    
    
  17:25 17:40 17:58     
     
WBC  5.6    (4.0-11.0)  10^3/uL    
     
RBC  5.69    (4.70-6.10)  10^6/uL    
     
Hgb  18.6 H    (14.0-18.0)  g/dL    
     
Hct  57.4 H    (42.0-54.0)  %    
     
MCV  100.9 H    (80.0-94.0)  fL    
     
MCH  32.7    (25.9-34.0)  pg    
     
MCHC  32.4    (29.9-35.2)  g/dL    
     
RDW  13.3    (11.0-15.0)  %    
     
Plt Count  210    (150-450)  10^3/uL    
     
MPV  9.9    (9.5-13.5)  fL    
     
Neut % (Auto)  65.1    (43.0-75.0)  %    
     
Lymph % (Auto)  17.1 L    (20.5-60.0)  %    
     
Mono % (Auto)  14.2 H    (1.7-12.0)  %    
     
Eos % (Auto)  2.7    (0.9-7.0)  %    
     
Baso % (Auto)  0.5    (0.2-2.0)  %    
     
Neut # (Auto)  3.6    (1.4-6.5)  10^3/uL    
     
Lymph # (Auto)  1.0 L    (1.2-3.8)  10^3/uL    
     
Mono # (Auto)  0.8    (0.3-0.8)  10^3/uL    
     
Eos # (Auto)  0.2    (0.0-0.7)  10^3/uL    
     
Baso # (Auto)  0.0    (0.0-0.1)  10^3/uL    
     
Abs Immat Gran (auto)  0.02    (0.00-0.03)  10^3/uL    
     
Imm/Tot Granulo (auto)  0.4    (0.0-0.5)  %    
     
PT  12.0 H    (9.0-11.6)  sec    
     
INR  1.14        
     
APTT  30.9    (22.3-36.2)  sec    
     
D-Dimer  2.49 H*    (<=0.59)  mg/L FEU    
     
Puncture Site    Rr      
     
ABG pH    7.193 L*  (7.350-7.450)      
     
ABG pCO2    88.4 H*  (35.0-45.0)  mmHg    
     
ABG pO2    93.1  (80.0-100.0)  mmHg    
     
ABG HCO3    33.9 H  (22.0-26.0)  mmol/L    
     
ABG O2 Saturation    96.0  %    
     
ABG Base Excess    5.8 H  (-2.0-2.0)  mmol/L    
     
Oliverio Test    Positive  (POSITIVE)      
     
O2 Liters/Min    4      
     
Sodium  121 L*    (136-145)  mmol/L    
     
Potassium  4.8    (3.5-5.1)  mmol/L    
     
Chloride  84 L*    ()  mmol/L    
     
Carbon Dioxide  33.2 H    (21.0-32.0)  mmol/L    
     
Anion Gap  8.6        
     
BUN  33.0 H    (7.0-18.0)  mg/dL    
     
Creatinine  1.25    (0.70-1.30)  mg/dL    
     
Est GFR ( Amer)  >60    (>=60)      
     
Est GFR (Non-Af Amer)  59 L    (>=60)      
     
BUN/Creatinine Ratio  26.4        
     
Glucose  128 H    ()  mg/dL    
     
Lactate  3.0 H*    (0.4-2.0)  mmol/L    
     
Calcium  8.5    (8.5-10.1)  mg/dL    
     
Total Bilirubin  0.9    (0.2-1.0)  mg/dL    
     
AST  64 H    (15-37)  U/L    
     
ALT  66 H    (16-63)  U/L    
     
Alkaline Phosphatase  74    ()  U/L    
     
Troponin I High Sens  149.8 H*    (4.0-76.1)  pg/mL    
     
NT-Pro-B Natriuret Pep  92213.0 H*    (<=900.0)  pg/mL    
     
Total Protein  7.7    (6.4-8.2)  g/dL    
     
Albumin  3.7    (3.4-5.0)  g/dL    
     
Globulin  4.0    g/dL    
     
Albumin/Globulin Ratio  0.9        
     
Urine Color     (YELLOW)      
     
Urine Clarity     (CLEAR)      
     
Urine pH     (5.0-9.0)      
     
Ur Specific Gravity     (1.005-1.025)      
     
Urine Protein     (NEG/TRACE)  mg/dL    
     
Urine Glucose (UA)     (NEGATIVE)  mg/dL    
     
Urine Ketones     (NEGATIVE)  mg/dL    
     
Urine Occult Blood     (NEGATIVE)      
     
Urine Nitrite     (NEGATIVE)      
     
Urine Bilirubin     (NEGATIVE)      
     
Urine Urobilinogen     (0.2-1.0)  EU/dL    
     
Ur Leukocyte Esterase     (NEGATIVE)      
     
Urine RBC     (0-2)  #/HPF    
     
Urine WBC     (NONE SEEN)  #/HPF    
     
Ur Squamous Epith Cells     (NONE/RARE)  #/LPF    
     
Urine Crystals     (None Seen)  #/HPF    
     
Urine Bacteria     (NONE SEEN)  #/HPF    
     
Urine Casts     (NONE SEEN)  #/LPF    
     
Hyaline Casts         
     
Fine Granular Casts         
     
Waxy Casts         
     
Urine Mucus     (NONE SEEN)      
     
Ur Culture Indicated?         
     
SARS-CoV-2 (PCR)   Negative   (NEGATIVE)      
     
Influenza Type A Ag   Negative       
     
Influenza Type B Ag   Negative       
     
POC Glucose     ()  mg/dL    
    
    
    
    
  10/08/23 10/08/23 10/08/23 Range/Units    
    
  19:20 19:23 20:25     
     
WBC     (4.0-11.0)  10^3/uL    
     
RBC     (4.70-6.10)  10^6/uL    
     
Hgb     (14.0-18.0)  g/dL    
     
Hct     (42.0-54.0)  %    
     
MCV     (80.0-94.0)  fL    
     
MCH     (25.9-34.0)  pg    
     
MCHC     (29.9-35.2)  g/dL    
     
RDW     (11.0-15.0)  %    
     
Plt Count     (150-450)  10^3/uL    
     
MPV     (9.5-13.5)  fL    
     
Neut % (Auto)     (43.0-75.0)  %    
     
Lymph % (Auto)     (20.5-60.0)  %    
     
Mono % (Auto)     (1.7-12.0)  %    
     
Eos % (Auto)     (0.9-7.0)  %    
     
Baso % (Auto)     (0.2-2.0)  %    
     
Neut # (Auto)     (1.4-6.5)  10^3/uL    
     
Lymph # (Auto)     (1.2-3.8)  10^3/uL    
     
Mono # (Auto)     (0.3-0.8)  10^3/uL    
     
Eos # (Auto)     (0.0-0.7)  10^3/uL    
     
Baso # (Auto)     (0.0-0.1)  10^3/uL    
     
Abs Immat Gran (auto)     (0.00-0.03)  10^3/uL    
     
Imm/Tot Granulo (auto)     (0.0-0.5)  %    
     
PT     (9.0-11.6)  sec    
     
INR         
     
APTT     (22.3-36.2)  sec    
     
D-Dimer     (<=0.59)  mg/L FEU    
     
Puncture Site         
     
ABG pH     (7.350-7.450)      
     
ABG pCO2     (35.0-45.0)  mmHg    
     
ABG pO2     (80.0-100.0)  mmHg    
     
ABG HCO3     (22.0-26.0)  mmol/L    
     
ABG O2 Saturation     %    
     
ABG Base Excess     (-2.0-2.0)  mmol/L    
     
Oliverio Test     (POSITIVE)      
     
O2 Liters/Min         
     
Sodium     (136-145)  mmol/L    
     
Potassium     (3.5-5.1)  mmol/L    
     
Chloride     ()  mmol/L    
     
Carbon Dioxide     (21.0-32.0)  mmol/L    
     
Anion Gap         
     
BUN     (7.0-18.0)  mg/dL    
     
Creatinine     (0.70-1.30)  mg/dL    
     
Est GFR ( Amer)     (>=60)      
     
Est GFR (Non-Af Amer)     (>=60)      
     
BUN/Creatinine Ratio         
     
Glucose     ()  mg/dL    
     
Lactate    1.2  (0.4-2.0)  mmol/L    
     
Calcium     (8.5-10.1)  mg/dL    
     
Total Bilirubin     (0.2-1.0)  mg/dL    
     
AST     (15-37)  U/L    
     
ALT     (16-63)  U/L    
     
Alkaline Phosphatase     ()  U/L    
     
Troponin I High Sens  138.4 H*    (4.0-76.1)  pg/mL    
     
NT-Pro-B Natriuret Pep     (<=900.0)  pg/mL    
     
Total Protein     (6.4-8.2)  g/dL    
     
Albumin     (3.4-5.0)  g/dL    
     
Globulin     g/dL    
     
Albumin/Globulin Ratio         
     
Urine Color     (YELLOW)      
     
Urine Clarity     (CLEAR)      
     
Urine pH     (5.0-9.0)      
     
Ur Specific Gravity     (1.005-1.025)      
     
Urine Protein     (NEG/TRACE)  mg/dL    
     
Urine Glucose (UA)     (NEGATIVE)  mg/dL    
     
Urine Ketones     (NEGATIVE)  mg/dL    
     
Urine Occult Blood     (NEGATIVE)      
     
Urine Nitrite     (NEGATIVE)      
     
Urine Bilirubin     (NEGATIVE)      
     
Urine Urobilinogen     (0.2-1.0)  EU/dL    
     
Ur Leukocyte Esterase     (NEGATIVE)      
     
Urine RBC     (0-2)  #/HPF    
     
Urine WBC     (NONE SEEN)  #/HPF    
     
Ur Squamous Epith Cells     (NONE/RARE)  #/LPF    
     
Urine Crystals     (None Seen)  #/HPF    
     
Urine Bacteria     (NONE SEEN)  #/HPF    
     
Urine Casts     (NONE SEEN)  #/LPF    
     
Hyaline Casts         
     
Fine Granular Casts         
     
Waxy Casts         
     
Urine Mucus     (NONE SEEN)      
     
Ur Culture Indicated?         
     
SARS-CoV-2 (PCR)     (NEGATIVE)      
     
Influenza Type A Ag         
     
Influenza Type B Ag         
     
POC Glucose   126 H   ()  mg/dL    
    
    
    
    
  10/08/23 Range/Units    
    
  21:10     
     
WBC   (4.0-11.0)  10^3/uL    
     
RBC   (4.70-6.10)  10^6/uL    
     
Hgb   (14.0-18.0)  g/dL    
     
Hct   (42.0-54.0)  %    
     
MCV   (80.0-94.0)  fL    
     
MCH   (25.9-34.0)  pg    
     
MCHC   (29.9-35.2)  g/dL    
     
RDW   (11.0-15.0)  %    
     
Plt Count   (150-450)  10^3/uL    
     
MPV   (9.5-13.5)  fL    
     
Neut % (Auto)   (43.0-75.0)  %    
     
Lymph % (Auto)   (20.5-60.0)  %    
     
Mono % (Auto)   (1.7-12.0)  %    
     
Eos % (Auto)   (0.9-7.0)  %    
     
Baso % (Auto)   (0.2-2.0)  %    
     
Neut # (Auto)   (1.4-6.5)  10^3/uL    
     
Lymph # (Auto)   (1.2-3.8)  10^3/uL    
     
Mono # (Auto)   (0.3-0.8)  10^3/uL    
     
Eos # (Auto)   (0.0-0.7)  10^3/uL    
     
Baso # (Auto)   (0.0-0.1)  10^3/uL    
     
Abs Immat Gran (auto)   (0.00-0.03)  10^3/uL    
     
Imm/Tot Granulo (auto)   (0.0-0.5)  %    
     
PT   (9.0-11.6)  sec    
     
INR       
     
APTT   (22.3-36.2)  sec    
     
D-Dimer   (<=0.59)  mg/L FEU    
     
Puncture Site       
     
ABG pH   (7.350-7.450)      
     
ABG pCO2   (35.0-45.0)  mmHg    
     
ABG pO2   (80.0-100.0)  mmHg    
     
ABG HCO3   (22.0-26.0)  mmol/L    
     
ABG O2 Saturation   %    
     
ABG Base Excess   (-2.0-2.0)  mmol/L    
     
Oliverio Test   (POSITIVE)      
     
O2 Liters/Min       
     
Sodium   (136-145)  mmol/L    
     
Potassium   (3.5-5.1)  mmol/L    
     
Chloride   ()  mmol/L    
     
Carbon Dioxide   (21.0-32.0)  mmol/L    
     
Anion Gap       
     
BUN   (7.0-18.0)  mg/dL    
     
Creatinine   (0.70-1.30)  mg/dL    
     
Est GFR ( Amer)   (>=60)      
     
Est GFR (Non-Af Amer)   (>=60)      
     
BUN/Creatinine Ratio       
     
Glucose   ()  mg/dL    
     
Lactate   (0.4-2.0)  mmol/L    
     
Calcium   (8.5-10.1)  mg/dL    
     
Total Bilirubin   (0.2-1.0)  mg/dL    
     
AST   (15-37)  U/L    
     
ALT   (16-63)  U/L    
     
Alkaline Phosphatase   ()  U/L    
     
Troponin I High Sens   (4.0-76.1)  pg/mL    
     
NT-Pro-B Natriuret Pep   (<=900.0)  pg/mL    
     
Total Protein   (6.4-8.2)  g/dL    
     
Albumin   (3.4-5.0)  g/dL    
     
Globulin   g/dL    
     
Albumin/Globulin Ratio       
     
Urine Color  Yellow  (YELLOW)      
     
Urine Clarity  Clear  (CLEAR)      
     
Urine pH  5.5  (5.0-9.0)      
     
Ur Specific Gravity  >=1.030 A  (1.005-1.025)      
     
Urine Protein  >=300 A  (NEG/TRACE)  mg/dL    
     
Urine Glucose (UA)  Negative  (NEGATIVE)  mg/dL    
     
Urine Ketones  Negative  (NEGATIVE)  mg/dL    
     
Urine Occult Blood  Moderate A  (NEGATIVE)      
     
Urine Nitrite  Negative  (NEGATIVE)      
     
Urine Bilirubin  Small A  (NEGATIVE)      
     
Urine Urobilinogen  1.0  (0.2-1.0)  EU/dL    
     
Ur Leukocyte Esterase  Negative  (NEGATIVE)      
     
Urine RBC  0-2  (0-2)  #/HPF    
     
Urine WBC  0-2 A  (NONE SEEN)  #/HPF    
     
Ur Squamous Epith Cells  Few A  (NONE/RARE)  #/LPF    
     
Urine Crystals  None seen  (None Seen)  #/HPF    
     
Urine Bacteria  Large A  (NONE SEEN)  #/HPF    
     
Urine Casts  Seen A  (NONE SEEN)  #/LPF    
     
Hyaline Casts  Many      
     
Fine Granular Casts  Few      
     
Waxy Casts  Rare      
     
Urine Mucus  Moderate A  (NONE SEEN)      
     
Ur Culture Indicated?  Yes      
     
SARS-CoV-2 (PCR)   (NEGATIVE)      
     
Influenza Type A Ag       
     
Influenza Type B Ag       
     
POC Glucose   ()  mg/dL    
    
    
    
    
Imaging Data    
CT scan - chest:     
      Radiologist's impression:     
Procedure:  CT angio chest    
EXAM: CT angio chest    
TECHNIQUE: CT angiogram    
 with contrast performed of the chest    
 including multi     
planar reformatted images    
 and maximum    
 intensity projection images. 3-D volume     
rendering created.    
 Dose    
 reduction techniques    
 were    
 achieved by using automated    
exposure control and/or    
 adjustment of mA and/or kV according    
 to patient size     
and/or use of iterative reconstruction technique.     
HISTORY: r/o pE elevated d-dimer     
COMPARISON: None.     
______________________    
FINDINGS:    
Neck and Axilla: No lower neck    
 or axillary    
 lymphadenopathy.    
Mediastinum and [place]: No hilar or mediastinal lymphadenopathy. The esophagus    
 is      
grossly unremarkable    
 without dilatation    
 or gross mass lesion.    
Heart and    
 Major Vessels:    
 The heart appears unremarkable for size    
 without     
pericardial effusion. The aorta and central pulmonary arteries are unremarkable     
for size.Evaluation of    
 the pulmonary embolism is    
 limited    
 by breathing motion     
artifact. There    
 is no convincing    
 evidence    
 for acute pulmonary embolism.    
Lung Fields: The lungs    
 are clear of acute infiltrate or mass.    
Pleural Spaces: No significant pleural effusion. No pneumothorax.    
Upper Abdomen: No acute    
 abnormality identified.    
Chest Wall: No acute abnormality.    
    
    
_________________________    
IMPRESSION:     
No evidence for    
 acute pulmonary embolism. Evaluation    
 somewhat limited for     
distal pulmonary    
 arteries due    
 to motion artifact.    
No acute pulmonary consolidation    
Electronically authenticated by: NAS GARZA   Date: 10/08/2023    
    
Critical Care Time    
Critical Care Time    
Critical Care Time: Yes    
Total Critical Care Time: 45    
Attestation:     
Due to the high probability of sudden and clinically significant deterioration   
in the patient's condition he/she required the highest level of my preparedness   
to intervene urgently I provided critical care time including documentation   
time, medication orders and management, reevaluation, vital sign assessment,   
ordering and reviewing of lab tests, ordering and reviewing of x-ray studies,   
and admission orders. Aggregate critical care time is 45 minutes including only   
time during which I was engaged in work directly related to his/her care and did  
not include time spent treating other patients simultaneously.    
    
Discharge Plan    
Discharge    
Chief Complaint: Shortness of Breath/Dyspnea    
    
Clinical Impression:    
 Hyponatremia, Elevated troponin, Tobacco abuse, Hypoxia, Acute exacerbation of   
chronic obstructive pulmonary disease    
    
    
Patient Disposition: Admitted As Inpatient    
    
Time of Disposition Decision: 19:08    
    
Condition: Fair

## 2023-10-08 NOTE — PC.NURSE
on entry to patient room, patient is ill appearing, short of breath and sitting on edge of bed in tripod position. He is wearing oxygen at 4 liters per nasal cannula with SPo2 89-90%. Family is at bedside, states he is more tired than normal, he is 
confused and he has not been able to sleep for several days due to both shortness of breath and the stress of taking care of his wife. He had asked for a cigarette, but a nicotine patch was offered instead. He has reluctantly agreed to stay in the 
hospital for the night. He is offered food and water but has not taken in anything yet. He is moved to a wheelchair because it is easier for him to breathe sitting up in it. Family had many questions, all of which were answered. Family is given his 
room number and have left for the night.

## 2023-10-08 NOTE — ED.GENADUL1
HPI - General Adult
General
Chief complaint: Shortness of Breath/Dyspnea
Stated complaint: SOB
Time Seen by Provider: 10/08/23 17:05
Source: patient
Mode of arrival: Wheelchair
Limitations: no limitations
History of Present Illness
HPI narrative: 
please see full history and physical.
Related Data
Allergies

Allergy/AdvReac Type Severity Reaction Status Date / Time
Penicillins Allergy Unknown  Verified 10/08/23 17:25



PFSH
PFSH
Social History
Smoking status:  Current every day smoker 



Exam
Constitutional
Vital Signs, click to edit/add: 

Last Vital Signs

Pulse  94 H  10/08/23 22:15
Resp  27 H  10/08/23 22:15
BP  135/88   10/08/23 22:15
Pulse Ox  89 L  10/08/23 22:15
O2 Del Method  Nasal Cannula  10/08/23 17:39
O2 Flow Rate  4   10/08/23 17:39
FiO2  50   10/08/23 22:00




Course
Vital Signs
Vital signs: 

Vital Signs

Pulse Rate  114 H  10/08/23 17:08
Respiratory Rate  20   10/08/23 17:08
Blood Pressure  149/106 H  10/08/23 17:08
Pulse Oximetry  68 L  10/08/23 17:08
Oxygen Delivery Method  Room Air  10/08/23 17:08



Pulse Rate  94 H  10/08/23 22:15
Respiratory Rate  27 H  10/08/23 22:15
Blood Pressure  135/88   10/08/23 22:15
Pulse Oximetry  89 L  10/08/23 22:15
Oxygen Delivery Method  Nasal Cannula  10/08/23 17:39
Oxygen Delivery Flow Rate  4   10/08/23 17:39
Fraction of Inspired Oxygen  50   10/08/23 22:00




Medical Decision Making
[facility] Narrative
Medical decision making narrative: 
The patient presents with chronic obstructive pulmonary disease exacerbation. He appears to be a chronic CO2 retainer and is noncompliant. The patient became somewhat drowsy and was placed on BiPAP and is being admitted to the ICU.  He continues to 
be hemodynamically stable with an O2 sat of ninety percent.  Repeat troponin has decreased.
Differential Diagnosis
Differential Diagnosis: pneumonia, chronic obstructive pulmonary disease exacerbation
Lab Data
Lab results reviewed: Yes I reviewed the patient's lab results
Labs: 

Lab Results

  10/08/23 10/08/23 10/08/23 Range/Units
  17:25 17:40 17:58 
WBC  5.6    (4.0-11.0)  10^3/uL
RBC  5.69    (4.70-6.10)  10^6/uL
Hgb  18.6 H    (14.0-18.0)  g/dL
Hct  57.4 H    (42.0-54.0)  %
MCV  100.9 H    (80.0-94.0)  fL
MCH  32.7    (25.9-34.0)  pg
MCHC  32.4    (29.9-35.2)  g/dL
RDW  13.3    (11.0-15.0)  %
Plt Count  210    (150-450)  10^3/uL
MPV  9.9    (9.5-13.5)  fL
Neut % (Auto)  65.1    (43.0-75.0)  %
Lymph % (Auto)  17.1 L    (20.5-60.0)  %
Mono % (Auto)  14.2 H    (1.7-12.0)  %
Eos % (Auto)  2.7    (0.9-7.0)  %
Baso % (Auto)  0.5    (0.2-2.0)  %
Neut # (Auto)  3.6    (1.4-6.5)  10^3/uL
Lymph # (Auto)  1.0 L    (1.2-3.8)  10^3/uL
Mono # (Auto)  0.8    (0.3-0.8)  10^3/uL
Eos # (Auto)  0.2    (0.0-0.7)  10^3/uL
Baso # (Auto)  0.0    (0.0-0.1)  10^3/uL
Abs Immat Gran (auto)  0.02    (0.00-0.03)  10^3/uL
Imm/Tot Granulo (auto)  0.4    (0.0-0.5)  %
PT  12.0 H    (9.0-11.6)  sec
INR  1.14    
APTT  30.9    (22.3-36.2)  sec
D-Dimer  2.49 H*    (<=0.59)  mg/L FEU
Puncture Site    Rr  
ABG pH    7.193 L*  (7.350-7.450)  
ABG pCO2    88.4 H*  (35.0-45.0)  mmHg
ABG pO2    93.1  (80.0-100.0)  mmHg
ABG HCO3    33.9 H  (22.0-26.0)  mmol/L
ABG O2 Saturation    96.0  %
ABG Base Excess    5.8 H  (-2.0-2.0)  mmol/L
Oliverio Test    Positive  (POSITIVE)  
O2 Liters/Min    4  
Sodium  121 L*    (136-145)  mmol/L
Potassium  4.8    (3.5-5.1)  mmol/L
Chloride  84 L*    ()  mmol/L
Carbon Dioxide  33.2 H    (21.0-32.0)  mmol/L
Anion Gap  8.6    
BUN  33.0 H    (7.0-18.0)  mg/dL
Creatinine  1.25    (0.70-1.30)  mg/dL
Est GFR ( Amer)  >60    (>=60)  
Est GFR (Non-Af Amer)  59 L    (>=60)  
BUN/Creatinine Ratio  26.4    
Glucose  128 H    ()  mg/dL
Lactate  3.0 H*    (0.4-2.0)  mmol/L
Calcium  8.5    (8.5-10.1)  mg/dL
Total Bilirubin  0.9    (0.2-1.0)  mg/dL
AST  64 H    (15-37)  U/L
ALT  66 H    (16-63)  U/L
Alkaline Phosphatase  74    ()  U/L
Troponin I High Sens  149.8 H*    (4.0-76.1)  pg/mL
NT-Pro-B Natriuret Pep  26906.0 H*    (<=900.0)  pg/mL
Total Protein  7.7    (6.4-8.2)  g/dL
Albumin  3.7    (3.4-5.0)  g/dL
Globulin  4.0    g/dL
Albumin/Globulin Ratio  0.9    
Urine Color     (YELLOW)  
Urine Clarity     (CLEAR)  
Urine pH     (5.0-9.0)  
Ur Specific Gravity     (1.005-1.025)  
Urine Protein     (NEG/TRACE)  mg/dL
Urine Glucose (UA)     (NEGATIVE)  mg/dL
Urine Ketones     (NEGATIVE)  mg/dL
Urine Occult Blood     (NEGATIVE)  
Urine Nitrite     (NEGATIVE)  
Urine Bilirubin     (NEGATIVE)  
Urine Urobilinogen     (0.2-1.0)  EU/dL
Ur Leukocyte Esterase     (NEGATIVE)  
Urine RBC     (0-2)  #/HPF
Urine WBC     (NONE SEEN)  #/HPF
Ur Squamous Epith Cells     (NONE/RARE)  #/LPF
Urine Crystals     (None Seen)  #/HPF
Urine Bacteria     (NONE SEEN)  #/HPF
Urine Casts     (NONE SEEN)  #/LPF
Hyaline Casts     
Fine Granular Casts     
Waxy Casts     
Urine Mucus     (NONE SEEN)  
Ur Culture Indicated?     
SARS-CoV-2 (PCR)   Negative   (NEGATIVE)  
Influenza Type A Ag   Negative   
Influenza Type B Ag   Negative   
POC Glucose     ()  mg/dL

  10/08/23 10/08/23 10/08/23 Range/Units
  19:20 19:23 20:25 
WBC     (4.0-11.0)  10^3/uL
RBC     (4.70-6.10)  10^6/uL
Hgb     (14.0-18.0)  g/dL
Hct     (42.0-54.0)  %
MCV     (80.0-94.0)  fL
MCH     (25.9-34.0)  pg
MCHC     (29.9-35.2)  g/dL
RDW     (11.0-15.0)  %
Plt Count     (150-450)  10^3/uL
MPV     (9.5-13.5)  fL
Neut % (Auto)     (43.0-75.0)  %
Lymph % (Auto)     (20.5-60.0)  %
Mono % (Auto)     (1.7-12.0)  %
Eos % (Auto)     (0.9-7.0)  %
Baso % (Auto)     (0.2-2.0)  %
Neut # (Auto)     (1.4-6.5)  10^3/uL
Lymph # (Auto)     (1.2-3.8)  10^3/uL
Mono # (Auto)     (0.3-0.8)  10^3/uL
Eos # (Auto)     (0.0-0.7)  10^3/uL
Baso # (Auto)     (0.0-0.1)  10^3/uL
Abs Immat Gran (auto)     (0.00-0.03)  10^3/uL
Imm/Tot Granulo (auto)     (0.0-0.5)  %
PT     (9.0-11.6)  sec
INR     
APTT     (22.3-36.2)  sec
D-Dimer     (<=0.59)  mg/L FEU
Puncture Site     
ABG pH     (7.350-7.450)  
ABG pCO2     (35.0-45.0)  mmHg
ABG pO2     (80.0-100.0)  mmHg
ABG HCO3     (22.0-26.0)  mmol/L
ABG O2 Saturation     %
ABG Base Excess     (-2.0-2.0)  mmol/L
Oliverio Test     (POSITIVE)  
O2 Liters/Min     
Sodium     (136-145)  mmol/L
Potassium     (3.5-5.1)  mmol/L
Chloride     ()  mmol/L
Carbon Dioxide     (21.0-32.0)  mmol/L
Anion Gap     
BUN     (7.0-18.0)  mg/dL
Creatinine     (0.70-1.30)  mg/dL
Est GFR ( Amer)     (>=60)  
Est GFR (Non-Af Amer)     (>=60)  
BUN/Creatinine Ratio     
Glucose     ()  mg/dL
Lactate    1.2  (0.4-2.0)  mmol/L
Calcium     (8.5-10.1)  mg/dL
Total Bilirubin     (0.2-1.0)  mg/dL
AST     (15-37)  U/L
ALT     (16-63)  U/L
Alkaline Phosphatase     ()  U/L
Troponin I High Sens  138.4 H*    (4.0-76.1)  pg/mL
NT-Pro-B Natriuret Pep     (<=900.0)  pg/mL
Total Protein     (6.4-8.2)  g/dL
Albumin     (3.4-5.0)  g/dL
Globulin     g/dL
Albumin/Globulin Ratio     
Urine Color     (YELLOW)  
Urine Clarity     (CLEAR)  
Urine pH     (5.0-9.0)  
Ur Specific Gravity     (1.005-1.025)  
Urine Protein     (NEG/TRACE)  mg/dL
Urine Glucose (UA)     (NEGATIVE)  mg/dL
Urine Ketones     (NEGATIVE)  mg/dL
Urine Occult Blood     (NEGATIVE)  
Urine Nitrite     (NEGATIVE)  
Urine Bilirubin     (NEGATIVE)  
Urine Urobilinogen     (0.2-1.0)  EU/dL
Ur Leukocyte Esterase     (NEGATIVE)  
Urine RBC     (0-2)  #/HPF
Urine WBC     (NONE SEEN)  #/HPF
Ur Squamous Epith Cells     (NONE/RARE)  #/LPF
Urine Crystals     (None Seen)  #/HPF
Urine Bacteria     (NONE SEEN)  #/HPF
Urine Casts     (NONE SEEN)  #/LPF
Hyaline Casts     
Fine Granular Casts     
Waxy Casts     
Urine Mucus     (NONE SEEN)  
Ur Culture Indicated?     
SARS-CoV-2 (PCR)     (NEGATIVE)  
Influenza Type A Ag     
Influenza Type B Ag     
POC Glucose   126 H   ()  mg/dL

  10/08/23 Range/Units
  21:10 
WBC   (4.0-11.0)  10^3/uL
RBC   (4.70-6.10)  10^6/uL
Hgb   (14.0-18.0)  g/dL
Hct   (42.0-54.0)  %
MCV   (80.0-94.0)  fL
MCH   (25.9-34.0)  pg
MCHC   (29.9-35.2)  g/dL
RDW   (11.0-15.0)  %
Plt Count   (150-450)  10^3/uL
MPV   (9.5-13.5)  fL
Neut % (Auto)   (43.0-75.0)  %
Lymph % (Auto)   (20.5-60.0)  %
Mono % (Auto)   (1.7-12.0)  %
Eos % (Auto)   (0.9-7.0)  %
Baso % (Auto)   (0.2-2.0)  %
Neut # (Auto)   (1.4-6.5)  10^3/uL
Lymph # (Auto)   (1.2-3.8)  10^3/uL
Mono # (Auto)   (0.3-0.8)  10^3/uL
Eos # (Auto)   (0.0-0.7)  10^3/uL
Baso # (Auto)   (0.0-0.1)  10^3/uL
Abs Immat Gran (auto)   (0.00-0.03)  10^3/uL
Imm/Tot Granulo (auto)   (0.0-0.5)  %
PT   (9.0-11.6)  sec
INR   
APTT   (22.3-36.2)  sec
D-Dimer   (<=0.59)  mg/L FEU
Puncture Site   
ABG pH   (7.350-7.450)  
ABG pCO2   (35.0-45.0)  mmHg
ABG pO2   (80.0-100.0)  mmHg
ABG HCO3   (22.0-26.0)  mmol/L
ABG O2 Saturation   %
ABG Base Excess   (-2.0-2.0)  mmol/L
Oliverio Test   (POSITIVE)  
O2 Liters/Min   
Sodium   (136-145)  mmol/L
Potassium   (3.5-5.1)  mmol/L
Chloride   ()  mmol/L
Carbon Dioxide   (21.0-32.0)  mmol/L
Anion Gap   
BUN   (7.0-18.0)  mg/dL
Creatinine   (0.70-1.30)  mg/dL
Est GFR ( Amer)   (>=60)  
Est GFR (Non-Af Amer)   (>=60)  
BUN/Creatinine Ratio   
Glucose   ()  mg/dL
Lactate   (0.4-2.0)  mmol/L
Calcium   (8.5-10.1)  mg/dL
Total Bilirubin   (0.2-1.0)  mg/dL
AST   (15-37)  U/L
ALT   (16-63)  U/L
Alkaline Phosphatase   ()  U/L
Troponin I High Sens   (4.0-76.1)  pg/mL
NT-Pro-B Natriuret Pep   (<=900.0)  pg/mL
Total Protein   (6.4-8.2)  g/dL
Albumin   (3.4-5.0)  g/dL
Globulin   g/dL
Albumin/Globulin Ratio   
Urine Color  Yellow  (YELLOW)  
Urine Clarity  Clear  (CLEAR)  
Urine pH  5.5  (5.0-9.0)  
Ur Specific Gravity  >=1.030 A  (1.005-1.025)  
Urine Protein  >=300 A  (NEG/TRACE)  mg/dL
Urine Glucose (UA)  Negative  (NEGATIVE)  mg/dL
Urine Ketones  Negative  (NEGATIVE)  mg/dL
Urine Occult Blood  Moderate A  (NEGATIVE)  
Urine Nitrite  Negative  (NEGATIVE)  
Urine Bilirubin  Small A  (NEGATIVE)  
Urine Urobilinogen  1.0  (0.2-1.0)  EU/dL
Ur Leukocyte Esterase  Negative  (NEGATIVE)  
Urine RBC  0-2  (0-2)  #/HPF
Urine WBC  0-2 A  (NONE SEEN)  #/HPF
Ur Squamous Epith Cells  Few A  (NONE/RARE)  #/LPF
Urine Crystals  None seen  (None Seen)  #/HPF
Urine Bacteria  Large A  (NONE SEEN)  #/HPF
Urine Casts  Seen A  (NONE SEEN)  #/LPF
Hyaline Casts  Many  
Fine Granular Casts  Few  
Waxy Casts  Rare  
Urine Mucus  Moderate A  (NONE SEEN)  
Ur Culture Indicated?  Yes  
SARS-CoV-2 (PCR)   (NEGATIVE)  
Influenza Type A Ag   
Influenza Type B Ag   
POC Glucose   ()  mg/dL



Imaging Data
CT scan - chest: 
      Radiologist's impression: 
Procedure:  CT angio chest
EXAM: CT angio chest
TECHNIQUE: CT angiogram
 with contrast performed of the chest
 including multi 
planar reformatted images
 and maximum
 intensity projection images. 3-D volume 
rendering created.
 Dose
 reduction techniques
 were
 achieved by using automated
exposure control and/or
 adjustment of mA and/or kV according
 to patient size 
and/or use of iterative reconstruction technique. 
HISTORY: r/o pE elevated d-dimer 
COMPARISON: None. 
______________________
FINDINGS:
Neck and Axilla: No lower neck
 or axillary
 lymphadenopathy.
Mediastinum and [place]: No hilar or mediastinal lymphadenopathy. The esophagus
 is  
grossly unremarkable
 without dilatation
 or gross mass lesion.
Heart and
 Major Vessels:
 The heart appears unremarkable for size
 without 
pericardial effusion. The aorta and central pulmonary arteries are unremarkable 
for size.Evaluation of
 the pulmonary embolism is
 limited
 by breathing motion 
artifact. There
 is no convincing
 evidence
 for acute pulmonary embolism.
Lung Fields: The lungs
 are clear of acute infiltrate or mass.
Pleural Spaces: No significant pleural effusion. No pneumothorax.
Upper Abdomen: No acute
 abnormality identified.
Chest Wall: No acute abnormality.


_________________________
IMPRESSION: 
No evidence for
 acute pulmonary embolism. Evaluation
 somewhat limited for 
distal pulmonary
 arteries due
 to motion artifact.
No acute pulmonary consolidation
Electronically authenticated by: NAS GARZA   Date: 10/08/2023

Critical Care Time
Critical Care Time
Critical Care Time: Yes
Total Critical Care Time: 45
Attestation: 
Due to the high probability of sudden and clinically significant deterioration in the patient's condition he/she required the highest level of my preparedness to intervene urgently I provided critical care time including documentation time, 
medication orders and management, reevaluation, vital sign assessment, ordering and reviewing of lab tests, ordering and reviewing of x-ray studies, and admission orders. Aggregate critical care time is 45 minutes including only time during which I 
was engaged in work directly related to his/her care and did not include time spent treating other patients simultaneously.

Discharge Plan
Discharge
Chief Complaint: Shortness of Breath/Dyspnea

Clinical Impression:
 Hyponatremia, Elevated troponin, Tobacco abuse, Hypoxia, Acute exacerbation of chronic obstructive pulmonary disease


Patient Disposition: Admitted As Inpatient

Time of Disposition Decision: 19:08

Condition: Fair

## 2023-10-08 NOTE — PC.NURSE
After Ativan was given, patient was able to relax, breathing was slowing down and SPo2 was maintaining in the low 90s. Patient started to desat, SPo2 falling into the 70s, oxygen was turned up and simple mask applied. There was no increase with 
this, so another nurse left to find physician while this RN applied nonrebreather. SPo2 did respond and went to the 90's however Dr Thurman wanted the non rebreather removed for fear of increased CO2 retention. At this time, patient was also given 0.3 
of FLumazenil because he was in respiratory distress and was not easily arousable after the ativan. Another 0.2 of flumazenil was given and patient dwas arousable and started to open his eyes. He was also restless, attempting to climb out of bed, 
fighting against attempts at a second IV.  Respiratory was called and asked to apply Bipap. Patient is now on bipap with respiratory still at bedside. Patient is resting and vitals are improved at this time. Will continue to monitor

## 2023-10-08 NOTE — ED_ITS
HPI - SOB/Dyspnea    
General    
Chief Complaint: Shortness of Breath/Dyspnea    
Stated Complaint: SOB    
Time Seen by Provider: 10/08/23 17:05    
Source: patient    
Mode of arrival: Wheelchair    
Limitations: no limitations    
History of Present Illness    
HPI Narrative:     
patient is a 62-year-old male presents to the Emergency Room with his daughter   
and brother for evaluation of shortness of breath. Patient recently retired, had  
a family member die eight weeks ago unexpectedly and has been noticing a decline  
in his health. Patient reports a 30 pound weight loss, increased shortness of   
breath with activity. He was previously on a inhaler for chronic obstructive   
pulmonary disease but has been out of this for some time and still smokes more   
than one pack per day. Patient presents with profound fatigue and shortness of   
breath on exertion. He denies any current pain or discomfort. Daughter at   
bedside notes some confusion and home pulse oximetry read very low compared  to   
her own which required a lot of talking the patient into coming in being seen.    
    
patient appears hypoxic with increased work of breathing, dusky cool   
skin..patient admits to having some sinus congestion within the past few weeks.    
MD elicited complaint: shortness of breath and cough    
Pertinent past history: COPD    
Context: recent illness    
Severity: mild    
Exacerbating factors: movement (any activity) and stress    
Relieving factors: nothing    
Known history of: COPD    
Related Data    
                                Home Medications    
    
    
    
 Medication  Instructions  Recorded  Confirmed    
     
No Known Home Medications  10/08/23 10/08/23    
    
    
    
                                    Allergies    
    
    
    
Allergy/AdvReac Type Severity Reaction Status Date / Time    
     
Penicillins Allergy Unknown  Verified 10/08/23 17:25    
    
    
    
    
Review of Systems    
    
    
ROS      
    
 Constitutional Reports: change in weight and fatigue; Denies: fever or chills    
    
    
 Eyes Denies: change in vision, blurry vision, blind spots or light sensitivity   
     
    
 Ears, nose, mouth, and throat Denies: throat pain or neck pain       
    
 Cardiovascular Reports: edema, shortness of breath when lying down and bluish   
discoloration of hands/feet; Denies: chest pain or palpitations       
    
 Respiratory Reports: shortness of breath, cough and wheezing; Denies: pain on   
inspiration       
    
 Gastrointestinal Denies: abdominal pain, nausea or vomiting       
    
 Musculoskeletal Denies: back pain or neck pain       
    
 Integumentary/Breast Denies: rash or itching       
    
 Neurological Reports: confusion; Denies: headache       
    
 Psychiatric Denies: anxiety or mood swings       
    
 Endocrine Denies: excessive urination       
    
    
[facility]    
Social History (Updated 10/08/23 @ 23:52 by Lesa Mcarthur)    
Smoking status:  Heavy tobacco smoker     
Do you think of yourself as:  straight/heterosexual     
Gender Identity:  male     
    
    
    
Exam    
Narrative    
Exam Narrative:     
Nurses notes and vital signs reviewed and patient is hypoxic    
    
General:  The patient appears well and in no apparent distress.  Patient is   
resting comfortably on cart.    
Skin:  cool pale dusky appearance of skin, bluish tint to hands and feet and   
nose with telangectasias and notable tobacco staining to his mustache    
Head:  Normocephalic, atraumatic    
Neck:  Supple, trachea mid-line, no tenderness, no lymphadenopathy    
Eye:  Pupils are equal, round and reactive to light, EOMI    
Ears, Nose, Mouth, and Throat:  TM are clear, normal light reflex, oral mucosa   
is moist, no posterior oropharynx erythema or hypertrophy, uvula is mid-line    
Cardiovascular:  tachycardic    
Respiratory: no accessory muscle use diffuse expiratory and has notable   
inspiratory and expiratory wheezing.    
Chest Wall:  no tenderness    
Back:  non-tender, no CVA tenderness    
Musculoskeletal:  normal ROM, no tenderness, minimal edema in the lower legs.    
GI:  Normal bowel sounds, no tenderness to palpation, no masses appreciated.  No  
rebound, guarding, or rigidity noted.    
Neurological:  A&O x4, recalls recent history of caring for family and death of   
family member 8 wks ago.      
Psychiatric:  Cooperative    
Constitutional    
Vital Signs, click to edit/add:     
    
                                Last Vital Signs    
    
    
    
Temp  98.7 F   10/08/23 23:02    
     
Pulse  72   10/09/23 04:30    
     
Resp  16   10/09/23 04:30    
     
BP  110/79   10/09/23 04:30    
     
Pulse Ox  94 L  10/09/23 04:30    
     
O2 Del Method  BIPAP  10/08/23 23:13    
     
O2 Flow Rate  4   10/08/23 17:39    
     
FiO2  40   10/09/23 04:30    
    
    
    
    
    
Course    
Vital Signs    
Vital signs:     
    
                                   Vital Signs    
    
    
    
Pulse Rate  114 H  10/08/23 17:08    
     
Respiratory Rate  20   10/08/23 17:08    
     
Blood Pressure  149/106 H  10/08/23 17:08    
     
Pulse Oximetry  68 L  10/08/23 17:08    
     
Oxygen Delivery Method  Room Air  10/08/23 17:08    
    
    
                                            
    
    
    
Temperature  98.7 F   10/08/23 23:02    
     
Pulse Rate  72   10/09/23 04:30    
     
Respiratory Rate  16   10/09/23 04:30    
     
Blood Pressure  110/79   10/09/23 04:30    
     
Pulse Oximetry  94 L  10/09/23 04:30    
     
Oxygen Delivery Method  BIPAP  10/08/23 23:13    
     
Oxygen Delivery Flow Rate  4   10/08/23 17:39    
     
Fraction of Inspired Oxygen  40   10/09/23 04:30    
    
    
    
    
    
MDM - SOB/Dyspnea    
MDM Narrative    
Medical decision making narrative:     
clinical presentation concerning for acute on chronic chronic obstructive   
pulmonary disease exacerbation. Patient not taking home inhaler, chronic smoking  
abuse, patient has had significant weight loss. Since retiring patient's health   
has significantly declined... Patient given DuoNeb and albuterol. Solu-Medrol   
125 mg IV. Patient placed on oxygen 4 L/m nasal cannula. Patient noting   
improvement in his breathing after treatments. Pulse oximetry stayed at 90%,   
compared to 64% on arrival. Patient had chest x-ray without evidence of   
infiltrate or mass. We discussed his laboratory studies noted for ABG pH is   
7.193, elevated lactic 3.0, PCO2 was 80.4. He was hyponatremic at one twenty-  
one, and troponin was elevated at 149.8 along with BNP of eleven thousand four   
twenty-five. Patient does not appear to be fluid retaining and was given a 1 L   
IV fluid bolus for his hyponatremia. Patient adamant about not being admitted to  
the hospital, bedside conversation with Dr. cabrera discussing today's findings with  
concern of death if patient signs out against medical advice. The patient agrees  
to stay at this hospital facility but would not accept a transfer to any other   
facility at this time. He declines transfer to tertiary facility. This case will  
be discussed with the hospitalist for admission and at some point likely   
pulmonary consult given the chronicity chronic obstructive pulmonary disease,   
untreated and tobacco abuse contributing to his symptoms with acute   
exacerbation.    
    
Hospitalist paged by Dr. Cabrera, Dr. Thurman will discuss admission    
Lab Data    
Attestation: I reviewed the patient's lab results.    
Labs:     
    
                                   Lab Results    
    
    
    
  10/08/23 10/08/23 10/08/23 Range/Units    
    
  17:25 17:40 17:58     
     
WBC  5.6    (4.0-11.0)  10^3/uL    
     
RBC  5.69    (4.70-6.10)  10^6/uL    
     
Hgb  18.6 H    (14.0-18.0)  g/dL    
     
Hct  57.4 H    (42.0-54.0)  %    
     
MCV  100.9 H    (80.0-94.0)  fL    
     
MCH  32.7    (25.9-34.0)  pg    
     
MCHC  32.4    (29.9-35.2)  g/dL    
     
RDW  13.3    (11.0-15.0)  %    
     
Plt Count  210    (150-450)  10^3/uL    
     
MPV  9.9    (9.5-13.5)  fL    
     
Neut % (Auto)  65.1    (43.0-75.0)  %    
     
Lymph % (Auto)  17.1 L    (20.5-60.0)  %    
     
Mono % (Auto)  14.2 H    (1.7-12.0)  %    
     
Eos % (Auto)  2.7    (0.9-7.0)  %    
     
Baso % (Auto)  0.5    (0.2-2.0)  %    
     
Neut # (Auto)  3.6    (1.4-6.5)  10^3/uL    
     
Lymph # (Auto)  1.0 L    (1.2-3.8)  10^3/uL    
     
Mono # (Auto)  0.8    (0.3-0.8)  10^3/uL    
     
Eos # (Auto)  0.2    (0.0-0.7)  10^3/uL    
     
Baso # (Auto)  0.0    (0.0-0.1)  10^3/uL    
     
Abs Immat Gran (auto)  0.02    (0.00-0.03)  10^3/uL    
     
Imm/Tot Granulo (auto)  0.4    (0.0-0.5)  %    
     
PT  12.0 H    (9.0-11.6)  sec    
     
INR  1.14        
     
APTT  30.9    (22.3-36.2)  sec    
     
D-Dimer  2.49 H*    (<=0.59)  mg/L FEU    
     
Puncture Site    Rr      
     
ABG pH    7.193 L*  (7.350-7.450)      
     
ABG pCO2    88.4 H*  (35.0-45.0)  mmHg    
     
ABG pO2    93.1  (80.0-100.0)  mmHg    
     
ABG HCO3    33.9 H  (22.0-26.0)  mmol/L    
     
ABG O2 Saturation    96.0  %    
     
ABG Base Excess    5.8 H  (-2.0-2.0)  mmol/L    
     
Oliverio Test    Positive  (POSITIVE)      
     
O2 Liters/Min    4      
     
Sodium  121 L*    (136-145)  mmol/L    
     
Potassium  4.8    (3.5-5.1)  mmol/L    
     
Chloride  84 L*    ()  mmol/L    
     
Carbon Dioxide  33.2 H    (21.0-32.0)  mmol/L    
     
Anion Gap  8.6        
     
BUN  33.0 H    (7.0-18.0)  mg/dL    
     
Creatinine  1.25    (0.70-1.30)  mg/dL    
     
Est GFR ( Amer)  >60    (>=60)      
     
Est GFR (Non-Af Amer)  59 L    (>=60)      
     
BUN/Creatinine Ratio  26.4        
     
Glucose  128 H    ()  mg/dL    
     
Lactate  3.0 H*    (0.4-2.0)  mmol/L    
     
Calcium  8.5    (8.5-10.1)  mg/dL    
     
Total Bilirubin  0.9    (0.2-1.0)  mg/dL    
     
AST  64 H    (15-37)  U/L    
     
ALT  66 H    (16-63)  U/L    
     
Alkaline Phosphatase  74    ()  U/L    
     
Troponin I High Sens  149.8 H*    (4.0-76.1)  pg/mL    
     
NT-Pro-B Natriuret Pep  15434.0 H*    (<=900.0)  pg/mL    
     
Total Protein  7.7    (6.4-8.2)  g/dL    
     
Albumin  3.7    (3.4-5.0)  g/dL    
     
Globulin  4.0    g/dL    
     
Albumin/Globulin Ratio  0.9        
     
Urine Color     (YELLOW)      
     
Urine Clarity     (CLEAR)      
     
Urine pH     (5.0-9.0)      
     
Ur Specific Gravity     (1.005-1.025)      
     
Urine Protein     (NEG/TRACE)  mg/dL    
     
Urine Glucose (UA)     (NEGATIVE)  mg/dL    
     
Urine Ketones     (NEGATIVE)  mg/dL    
     
Urine Occult Blood     (NEGATIVE)      
     
Urine Nitrite     (NEGATIVE)      
     
Urine Bilirubin     (NEGATIVE)      
     
Urine Urobilinogen     (0.2-1.0)  EU/dL    
     
Ur Leukocyte Esterase     (NEGATIVE)      
     
Urine RBC     (0-2)  #/HPF    
     
Urine WBC     (NONE SEEN)  #/HPF    
     
Ur Squamous Epith Cells     (NONE/RARE)  #/LPF    
     
Urine Crystals     (None Seen)  #/HPF    
     
Urine Bacteria     (NONE SEEN)  #/HPF    
     
Urine Casts     (NONE SEEN)  #/LPF    
     
Hyaline Casts         
     
Fine Granular Casts         
     
Waxy Casts         
     
Urine Mucus     (NONE SEEN)      
     
Ur Culture Indicated?         
     
SARS-CoV-2 (PCR)   Negative   (NEGATIVE)      
     
Influenza Type A Ag   Negative       
     
Influenza Type B Ag   Negative       
     
SARS-CoV-2 RNA (SATINDER)   Not detected   (NOT DETECTE)      
     
POC Glucose     ()  mg/dL    
    
    
    
    
  10/08/23 10/08/23 10/08/23 Range/Units    
    
  19:20 19:23 20:25     
     
WBC     (4.0-11.0)  10^3/uL    
     
RBC     (4.70-6.10)  10^6/uL    
     
Hgb     (14.0-18.0)  g/dL    
     
Hct     (42.0-54.0)  %    
     
MCV     (80.0-94.0)  fL    
     
MCH     (25.9-34.0)  pg    
     
MCHC     (29.9-35.2)  g/dL    
     
RDW     (11.0-15.0)  %    
     
Plt Count     (150-450)  10^3/uL    
     
MPV     (9.5-13.5)  fL    
     
Neut % (Auto)     (43.0-75.0)  %    
     
Lymph % (Auto)     (20.5-60.0)  %    
     
Mono % (Auto)     (1.7-12.0)  %    
     
Eos % (Auto)     (0.9-7.0)  %    
     
Baso % (Auto)     (0.2-2.0)  %    
     
Neut # (Auto)     (1.4-6.5)  10^3/uL    
     
Lymph # (Auto)     (1.2-3.8)  10^3/uL    
     
Mono # (Auto)     (0.3-0.8)  10^3/uL    
     
Eos # (Auto)     (0.0-0.7)  10^3/uL    
     
Baso # (Auto)     (0.0-0.1)  10^3/uL    
     
Abs Immat Gran (auto)     (0.00-0.03)  10^3/uL    
     
Imm/Tot Granulo (auto)     (0.0-0.5)  %    
     
PT     (9.0-11.6)  sec    
     
INR         
     
APTT     (22.3-36.2)  sec    
     
D-Dimer     (<=0.59)  mg/L FEU    
     
Puncture Site         
     
ABG pH     (7.350-7.450)      
     
ABG pCO2     (35.0-45.0)  mmHg    
     
ABG pO2     (80.0-100.0)  mmHg    
     
ABG HCO3     (22.0-26.0)  mmol/L    
     
ABG O2 Saturation     %    
     
ABG Base Excess     (-2.0-2.0)  mmol/L    
     
Oliverio Test     (POSITIVE)      
     
O2 Liters/Min         
     
Sodium     (136-145)  mmol/L    
     
Potassium     (3.5-5.1)  mmol/L    
     
Chloride     ()  mmol/L    
     
Carbon Dioxide     (21.0-32.0)  mmol/L    
     
Anion Gap         
     
BUN     (7.0-18.0)  mg/dL    
     
Creatinine     (0.70-1.30)  mg/dL    
     
Est GFR ( Amer)     (>=60)      
     
Est GFR (Non-Af Amer)     (>=60)      
     
BUN/Creatinine Ratio         
     
Glucose     ()  mg/dL    
     
Lactate    1.2  (0.4-2.0)  mmol/L    
     
Calcium     (8.5-10.1)  mg/dL    
     
Total Bilirubin     (0.2-1.0)  mg/dL    
     
AST     (15-37)  U/L    
     
ALT     (16-63)  U/L    
     
Alkaline Phosphatase     ()  U/L    
     
Troponin I High Sens  138.4 H*    (4.0-76.1)  pg/mL    
     
NT-Pro-B Natriuret Pep     (<=900.0)  pg/mL    
     
Total Protein     (6.4-8.2)  g/dL    
     
Albumin     (3.4-5.0)  g/dL    
     
Globulin     g/dL    
     
Albumin/Globulin Ratio         
     
Urine Color     (YELLOW)      
     
Urine Clarity     (CLEAR)      
     
Urine pH     (5.0-9.0)      
     
Ur Specific Gravity     (1.005-1.025)      
     
Urine Protein     (NEG/TRACE)  mg/dL    
     
Urine Glucose (UA)     (NEGATIVE)  mg/dL    
     
Urine Ketones     (NEGATIVE)  mg/dL    
     
Urine Occult Blood     (NEGATIVE)      
     
Urine Nitrite     (NEGATIVE)      
     
Urine Bilirubin     (NEGATIVE)      
     
Urine Urobilinogen     (0.2-1.0)  EU/dL    
     
Ur Leukocyte Esterase     (NEGATIVE)      
     
Urine RBC     (0-2)  #/HPF    
     
Urine WBC     (NONE SEEN)  #/HPF    
     
Ur Squamous Epith Cells     (NONE/RARE)  #/LPF    
     
Urine Crystals     (None Seen)  #/HPF    
     
Urine Bacteria     (NONE SEEN)  #/HPF    
     
Urine Casts     (NONE SEEN)  #/LPF    
     
Hyaline Casts         
     
Fine Granular Casts         
     
Waxy Casts         
     
Urine Mucus     (NONE SEEN)      
     
Ur Culture Indicated?         
     
SARS-CoV-2 (PCR)     (NEGATIVE)      
     
Influenza Type A Ag         
     
Influenza Type B Ag         
     
SARS-CoV-2 RNA (SATINDER)     (NOT DETECTE)      
     
POC Glucose   126 H   ()  mg/dL    
    
    
    
    
  10/08/23 Range/Units    
    
  21:10     
     
WBC   (4.0-11.0)  10^3/uL    
     
RBC   (4.70-6.10)  10^6/uL    
     
Hgb   (14.0-18.0)  g/dL    
     
Hct   (42.0-54.0)  %    
     
MCV   (80.0-94.0)  fL    
     
MCH   (25.9-34.0)  pg    
     
MCHC   (29.9-35.2)  g/dL    
     
RDW   (11.0-15.0)  %    
     
Plt Count   (150-450)  10^3/uL    
     
MPV   (9.5-13.5)  fL    
     
Neut % (Auto)   (43.0-75.0)  %    
     
Lymph % (Auto)   (20.5-60.0)  %    
     
Mono % (Auto)   (1.7-12.0)  %    
     
Eos % (Auto)   (0.9-7.0)  %    
     
Baso % (Auto)   (0.2-2.0)  %    
     
Neut # (Auto)   (1.4-6.5)  10^3/uL    
     
Lymph # (Auto)   (1.2-3.8)  10^3/uL    
     
Mono # (Auto)   (0.3-0.8)  10^3/uL    
     
Eos # (Auto)   (0.0-0.7)  10^3/uL    
     
Baso # (Auto)   (0.0-0.1)  10^3/uL    
     
Abs Immat Gran (auto)   (0.00-0.03)  10^3/uL    
     
Imm/Tot Granulo (auto)   (0.0-0.5)  %    
     
PT   (9.0-11.6)  sec    
     
INR       
     
APTT   (22.3-36.2)  sec    
     
D-Dimer   (<=0.59)  mg/L FEU    
     
Puncture Site       
     
ABG pH   (7.350-7.450)      
     
ABG pCO2   (35.0-45.0)  mmHg    
     
ABG pO2   (80.0-100.0)  mmHg    
     
ABG HCO3   (22.0-26.0)  mmol/L    
     
ABG O2 Saturation   %    
     
ABG Base Excess   (-2.0-2.0)  mmol/L    
     
Oliverio Test   (POSITIVE)      
     
O2 Liters/Min       
     
Sodium   (136-145)  mmol/L    
     
Potassium   (3.5-5.1)  mmol/L    
     
Chloride   ()  mmol/L    
     
Carbon Dioxide   (21.0-32.0)  mmol/L    
     
Anion Gap       
     
BUN   (7.0-18.0)  mg/dL    
     
Creatinine   (0.70-1.30)  mg/dL    
     
Est GFR ( Amer)   (>=60)      
     
Est GFR (Non-Af Amer)   (>=60)      
     
BUN/Creatinine Ratio       
     
Glucose   ()  mg/dL    
     
Lactate   (0.4-2.0)  mmol/L    
     
Calcium   (8.5-10.1)  mg/dL    
     
Total Bilirubin   (0.2-1.0)  mg/dL    
     
AST   (15-37)  U/L    
     
ALT   (16-63)  U/L    
     
Alkaline Phosphatase   ()  U/L    
     
Troponin I High Sens   (4.0-76.1)  pg/mL    
     
NT-Pro-B Natriuret Pep   (<=900.0)  pg/mL    
     
Total Protein   (6.4-8.2)  g/dL    
     
Albumin   (3.4-5.0)  g/dL    
     
Globulin   g/dL    
     
Albumin/Globulin Ratio       
     
Urine Color  Yellow  (YELLOW)      
     
Urine Clarity  Clear  (CLEAR)      
     
Urine pH  5.5  (5.0-9.0)      
     
Ur Specific Gravity  >=1.030 A  (1.005-1.025)      
     
Urine Protein  >=300 A  (NEG/TRACE)  mg/dL    
     
Urine Glucose (UA)  Negative  (NEGATIVE)  mg/dL    
     
Urine Ketones  Negative  (NEGATIVE)  mg/dL    
     
Urine Occult Blood  Moderate A  (NEGATIVE)      
     
Urine Nitrite  Negative  (NEGATIVE)      
     
Urine Bilirubin  Small A  (NEGATIVE)      
     
Urine Urobilinogen  1.0  (0.2-1.0)  EU/dL    
     
Ur Leukocyte Esterase  Negative  (NEGATIVE)      
     
Urine RBC  0-2  (0-2)  #/HPF    
     
Urine WBC  0-2 A  (NONE SEEN)  #/HPF    
     
Ur Squamous Epith Cells  Few A  (NONE/RARE)  #/LPF    
     
Urine Crystals  None seen  (None Seen)  #/HPF    
     
Urine Bacteria  Large A  (NONE SEEN)  #/HPF    
     
Urine Casts  Seen A  (NONE SEEN)  #/LPF    
     
Hyaline Casts  Many      
     
Fine Granular Casts  Few      
     
Waxy Casts  Rare      
     
Urine Mucus  Moderate A  (NONE SEEN)      
     
Ur Culture Indicated?  Yes      
     
SARS-CoV-2 (PCR)   (NEGATIVE)      
     
Influenza Type A Ag       
     
Influenza Type B Ag       
     
SARS-CoV-2 RNA (SATINDER)   (NOT DETECTE)      
     
POC Glucose   ()  mg/dL    
    
    
    
    
ABG Data    
Attestation: I have reviewed the pertinent ABG results.    
Imaging Data    
Chest x-ray:     
      Attestation: I have reviewed the pertinent imaging results.    
      Radiologist's impression:     
Patient Name:    
EDGAR REYES    
     
MRN: [id number]    YOB: 1960    Sex: M    
Assigned Patient Location: ED.MAIN    
Current Patient Location: ER    
Accession/Order Number: [id number]    
Exam Date: 10/08/2023  17:40    Report Date: 10/08/2023  17:56    
     
At the request of:    
RIVAS RODRIGEZ      
     
Procedure:  XR chest 1V    
     
EXAMINATION: XR chest 1V     
     
HISTORY: Shortness of breath     
     
COMPARISON: Chest x-rays 8/10/2022     
     
TECHNIQUE: Portable chest    
     
FINDINGS:     
     
The lung parenchyma is free of consolidation or infiltrate. No pneumothorax or     
     
pleural effusion. The cardiac, mediastinal and hilar contours are normal. The     
visualized osseous structures exhibit no gross abnormality.    
     
    
ORDER #: [id number] XR/XR chest 1V    
IMPRESSION:     
     
No acute cardiopulmonary abnormality.     
     
     
     
Electronically authenticated by: HARJIT MARIN   Date: 10/08/2023  17:56    
ECG Data    
Attestation: I personally reviewed and interpreted this ECG as follows:    
Interpretation:     
EKG interpretation:  Emergency Department physician interpretation, sinus   
tachycardia, incomplete right bundle-branch block.no st elevation.  axis   
indeterminate.     
    
    
Discharge Plan    
Discharge    
Chief Complaint: Shortness of Breath/Dyspnea    
    
Clinical Impression:    
 Hyponatremia, Elevated troponin, Tobacco abuse, Hypoxia, Acute exacerbation of   
chronic obstructive pulmonary disease    
    
    
Patient Disposition: Admitted As Inpatient    
    
Time of Disposition Decision: 19:08    
    
Condition: Fair    
    
Discharge Date/Time: 10/08/23 22:49

## 2023-10-08 NOTE — ECG_ITS
[facility] 
                                        
                                       Test Date:    2023-10-08 
Pat Name:     EDGAR REYES           Department:    
Patient ID:   [id number]               Room:         - 
Gender:       Male                     Technician:    
:          1960               Requested By: DAVID BENOIT 
Order Number: [id number]              Reading MD:   BERTA KEITA 
                                 Measurements 
Intervals                              Axis           
Rate:         109                      P:            79 
CT:           166                      QRS:          232 
QRSD:         96                       T:            53 
QT:           326                                     
QTc:          390                                     
                           Interpretive Statements 
1120 Sinus tachycardia 
2440 Incomplete right bundle branch block 
5134 Right ventricular hypertrophy with repolarization abnormality 
6220 Possible left atrial enlargement 
7300 Indeterminate axis 
9150 **  abnormal ECG  ** 
No previous ECG available for comparison 
Electronically Signed On 10-8-2023 18:24:48 EDT by BERTA KEITA

## 2023-10-08 NOTE — XR_ITS
The [address] [address] 
     [phone] 
  
  
Patient Name: 
EDGAR REYES 
  
MRN: [id number]    YOB: 1960    Sex: M 
Assigned Patient Location: ED.MAIN 
Current Patient Location: ER 
Accession/Order Number: [id number] 
Exam Date: 10/08/2023  17:40    Report Date: 10/08/2023  17:56 
  
At the request of: 
RIVAS RODRIGEZ   
  
Procedure:  XR chest 1V 
  
EXAMINATION: XR chest 1V  
  
HISTORY: Shortness of breath  
  
COMPARISON: Chest x-rays 8/10/2022  
  
TECHNIQUE: Portable chest 
  
FINDINGS:  
  
The lung parenchyma is free of consolidation or infiltrate. No pneumothorax or  
  
pleural effusion. The cardiac, mediastinal and hilar contours are normal. The  
visualized osseous structures exhibit no gross abnormality. 
  
ORDER #: [id number] XR/XR chest 1V  
IMPRESSION:   
   
No acute cardiopulmonary abnormality.   
   
   
   
Electronically authenticated by: HARJIT MARIN   Date: 10/08/2023  17:56

## 2023-10-09 ENCOUNTER — APPOINTMENT (OUTPATIENT)
Dept: GENERAL RADIOLOGY | Age: 63
End: 2023-10-09
Attending: INTERNAL MEDICINE
Payer: COMMERCIAL

## 2023-10-09 ENCOUNTER — HOSPITAL ENCOUNTER (INPATIENT)
Age: 63
LOS: 5 days | Discharge: HOME | End: 2023-10-14
Attending: INTERNAL MEDICINE | Admitting: INTERNAL MEDICINE
Payer: COMMERCIAL

## 2023-10-09 VITALS — HEART RATE: 74 BPM | OXYGEN SATURATION: 94 %

## 2023-10-09 VITALS — OXYGEN SATURATION: 95 % | DIASTOLIC BLOOD PRESSURE: 78 MMHG | SYSTOLIC BLOOD PRESSURE: 108 MMHG | HEART RATE: 70 BPM

## 2023-10-09 VITALS — HEART RATE: 70 BPM | OXYGEN SATURATION: 97 %

## 2023-10-09 VITALS — OXYGEN SATURATION: 93 % | DIASTOLIC BLOOD PRESSURE: 72 MMHG | HEART RATE: 72 BPM | SYSTOLIC BLOOD PRESSURE: 97 MMHG

## 2023-10-09 VITALS — DIASTOLIC BLOOD PRESSURE: 70 MMHG | HEART RATE: 74 BPM | OXYGEN SATURATION: 94 % | SYSTOLIC BLOOD PRESSURE: 99 MMHG

## 2023-10-09 VITALS — HEART RATE: 88 BPM | OXYGEN SATURATION: 91 % | RESPIRATION RATE: 25 BRPM

## 2023-10-09 VITALS — HEART RATE: 98 BPM | OXYGEN SATURATION: 98 %

## 2023-10-09 VITALS — SYSTOLIC BLOOD PRESSURE: 108 MMHG | HEART RATE: 83 BPM | DIASTOLIC BLOOD PRESSURE: 70 MMHG | OXYGEN SATURATION: 97 %

## 2023-10-09 VITALS — OXYGEN SATURATION: 96 % | HEART RATE: 95 BPM | RESPIRATION RATE: 16 BRPM

## 2023-10-09 VITALS — OXYGEN SATURATION: 98 % | DIASTOLIC BLOOD PRESSURE: 79 MMHG | HEART RATE: 90 BPM | SYSTOLIC BLOOD PRESSURE: 124 MMHG

## 2023-10-09 VITALS
SYSTOLIC BLOOD PRESSURE: 110 MMHG | HEART RATE: 72 BPM | RESPIRATION RATE: 16 BRPM | OXYGEN SATURATION: 94 % | DIASTOLIC BLOOD PRESSURE: 79 MMHG

## 2023-10-09 VITALS — SYSTOLIC BLOOD PRESSURE: 116 MMHG | OXYGEN SATURATION: 96 % | HEART RATE: 70 BPM | DIASTOLIC BLOOD PRESSURE: 82 MMHG

## 2023-10-09 VITALS — OXYGEN SATURATION: 88 % | HEART RATE: 94 BPM

## 2023-10-09 VITALS — OXYGEN SATURATION: 93 % | HEART RATE: 72 BPM

## 2023-10-09 VITALS — HEART RATE: 89 BPM

## 2023-10-09 VITALS — OXYGEN SATURATION: 98 % | HEART RATE: 89 BPM

## 2023-10-09 VITALS — SYSTOLIC BLOOD PRESSURE: 130 MMHG | OXYGEN SATURATION: 95 % | DIASTOLIC BLOOD PRESSURE: 89 MMHG | HEART RATE: 98 BPM

## 2023-10-09 VITALS — DIASTOLIC BLOOD PRESSURE: 67 MMHG | OXYGEN SATURATION: 95 % | HEART RATE: 75 BPM | SYSTOLIC BLOOD PRESSURE: 94 MMHG

## 2023-10-09 VITALS — HEART RATE: 70 BPM | DIASTOLIC BLOOD PRESSURE: 88 MMHG | SYSTOLIC BLOOD PRESSURE: 118 MMHG | OXYGEN SATURATION: 99 %

## 2023-10-09 VITALS — OXYGEN SATURATION: 100 % | HEART RATE: 70 BPM

## 2023-10-09 VITALS — OXYGEN SATURATION: 97 % | HEART RATE: 101 BPM

## 2023-10-09 VITALS — HEART RATE: 78 BPM | OXYGEN SATURATION: 94 %

## 2023-10-09 VITALS — SYSTOLIC BLOOD PRESSURE: 108 MMHG | HEART RATE: 70 BPM | DIASTOLIC BLOOD PRESSURE: 84 MMHG | OXYGEN SATURATION: 97 %

## 2023-10-09 VITALS — SYSTOLIC BLOOD PRESSURE: 165 MMHG | OXYGEN SATURATION: 86 % | HEART RATE: 102 BPM | DIASTOLIC BLOOD PRESSURE: 115 MMHG

## 2023-10-09 VITALS
DIASTOLIC BLOOD PRESSURE: 93 MMHG | HEART RATE: 88 BPM | SYSTOLIC BLOOD PRESSURE: 139 MMHG | OXYGEN SATURATION: 90 % | RESPIRATION RATE: 25 BRPM

## 2023-10-09 VITALS — HEART RATE: 70 BPM | OXYGEN SATURATION: 94 %

## 2023-10-09 VITALS — HEART RATE: 93 BPM | OXYGEN SATURATION: 97 %

## 2023-10-09 VITALS — DIASTOLIC BLOOD PRESSURE: 66 MMHG | OXYGEN SATURATION: 95 % | SYSTOLIC BLOOD PRESSURE: 95 MMHG | HEART RATE: 75 BPM

## 2023-10-09 VITALS — OXYGEN SATURATION: 89 % | HEART RATE: 96 BPM

## 2023-10-09 VITALS — RESPIRATION RATE: 12 BRPM

## 2023-10-09 VITALS — HEART RATE: 79 BPM | OXYGEN SATURATION: 95 %

## 2023-10-09 VITALS — RESPIRATION RATE: 27 BRPM | HEART RATE: 89 BPM | OXYGEN SATURATION: 93 %

## 2023-10-09 VITALS — HEART RATE: 89 BPM | RESPIRATION RATE: 28 BRPM | OXYGEN SATURATION: 91 %

## 2023-10-09 VITALS — HEART RATE: 70 BPM | OXYGEN SATURATION: 95 %

## 2023-10-09 VITALS — OXYGEN SATURATION: 93 % | HEART RATE: 83 BPM

## 2023-10-09 VITALS — OXYGEN SATURATION: 93 % | HEART RATE: 71 BPM

## 2023-10-09 VITALS — HEART RATE: 90 BPM | RESPIRATION RATE: 26 BRPM | OXYGEN SATURATION: 93 %

## 2023-10-09 VITALS — HEART RATE: 93 BPM | OXYGEN SATURATION: 90 %

## 2023-10-09 DIAGNOSIS — J44.1 ACUTE EXACERBATION OF CHRONIC OBSTRUCTIVE PULMONARY DISEASE (COPD) (HCC): Primary | ICD-10-CM

## 2023-10-09 LAB
ADD MANUAL DIFF: NO
ALBUMIN SERPL-MCNC: 3.2 G/DL (ref 3.5–5.2)
ALBUMIN/GLOB SERPL: 1.1 {RATIO} (ref 1–2.5)
ALLEN TEST: POSITIVE
ALP SERPL-CCNC: 57 U/L (ref 40–129)
ALT SERPL-CCNC: 44 U/L (ref 5–41)
AMORPH SED URNS QL MICRO: NORMAL
ANION GAP SERPL CALCULATED.3IONS-SCNC: 12 MMOL/L (ref 9–17)
ANION GAP SERPL CALCULATED.3IONS-SCNC: 15 MMOL/L (ref 9–17)
ANION GAP SERPL CALCULATED.3IONS-SCNC: 8 MMOL/L (ref 9–17)
ANION GAP: 6
AST SERPL-CCNC: 45 U/L
B PARAP IS1001 DNA NPH QL NAA+NON-PROBE: NOT DETECTED
B PERT DNA SPEC QL NAA+PROBE: NOT DETECTED
BASOPHILS # BLD: 0.04 K/UL (ref 0–0.2)
BASOPHILS NFR BLD: 1 % (ref 0–2)
BILIRUB SERPL-MCNC: 0.8 MG/DL (ref 0.3–1.2)
BILIRUB UR QL STRIP: NEGATIVE
BIPAP PRESSURE: (no result)
BLOOD UREA NITROGEN: 25 MG/DL (ref 7–18)
BNP SERPL-MCNC: 2542 PG/ML
BODY TEMPERATURE: 37
BUN SERPL-MCNC: 17 MG/DL (ref 8–23)
BUN SERPL-MCNC: 18 MG/DL (ref 8–23)
C PNEUM DNA NPH QL NAA+NON-PROBE: NOT DETECTED
CALCIUM SERPL-MCNC: 8.9 MG/DL (ref 8.6–10.4)
CALCIUM SERPL-MCNC: 9.5 MG/DL (ref 8.6–10.4)
CALCIUM: 8 MG/DL (ref 8.5–10.1)
CARBON DIOXIDE: 32.2 MMOL/L (ref 21–32)
CASTS #/AREA URNS LPF: NORMAL /LPF (ref 0–2)
CASTS #/AREA URNS LPF: NORMAL /LPF (ref 0–2)
CHLORIDE SERPL-SCNC: 87 MMOL/L (ref 98–107)
CHLORIDE SERPL-SCNC: 88 MMOL/L (ref 98–107)
CHLORIDE SERPL-SCNC: 89 MMOL/L (ref 98–107)
CHLORIDE: 89 MMOL/L (ref 98–107)
CLARITY UR: ABNORMAL
CO2 BLD-SCNC: 32.2 MMOL/L (ref 21–32)
CO2 SERPL-SCNC: 27 MMOL/L (ref 20–31)
CO2 SERPL-SCNC: 29 MMOL/L (ref 20–31)
CO2 SERPL-SCNC: 32 MMOL/L (ref 20–31)
CO2 SERPLBLD-SCNC: 101 MMHG (ref 35–45)
CO2 SERPLBLD-SCNC: 75.3 MMHG (ref 35–45)
COHGB MFR BLD: 1.5 % (ref 0–5)
COLOR UR: YELLOW
CREAT SERPL-MCNC: 0.4 MG/DL (ref 0.7–1.2)
CREAT SERPL-MCNC: 0.5 MG/DL (ref 0.7–1.2)
CREAT UR-MCNC: 84.6 MG/DL (ref 39–259)
CRP SERPL HS-MCNC: 26.1 MG/L (ref 0–5)
EKG ATRIAL RATE: 73 BPM
EKG P AXIS: 37 DEGREES
EKG P-R INTERVAL: 148 MS
EKG Q-T INTERVAL: 418 MS
EKG QRS DURATION: 106 MS
EKG QTC CALCULATION (BAZETT): 460 MS
EKG R AXIS: 48 DEGREES
EKG T AXIS: 63 DEGREES
EKG VENTRICULAR RATE: 73 BPM
EOSINOPHIL # BLD: 0 K/UL (ref 0–0.44)
EOSINOPHILS RELATIVE PERCENT: 0 % (ref 1–4)
EPI CELLS #/AREA URNS HPF: NORMAL /HPF (ref 0–5)
ERYTHROCYTE [DISTWIDTH] IN BLOOD BY AUTOMATED COUNT: 13.6 % (ref 11.8–14.4)
ESTIMATED GFR (AFRICAN AMERICA: >60 (ref 60–?)
ESTIMATED GFR (NON-AFRICAN AME: >60 (ref 60–?)
FIO2 ON VENT: ABNORMAL %
FIO2: 100
FIO2: 40
FLUAV RNA NPH QL NAA+NON-PROBE: NOT DETECTED
FLUBV RNA NPH QL NAA+NON-PROBE: NOT DETECTED
GAS PNL BLDA: 101 MMHG (ref 35–45)
GAS PNL BLDA: 75.3 MMHG (ref 35–45)
GFR SERPL CREATININE-BSD FRML MDRD: >60 ML/MIN/1.73M2
GFR SERPL CREATININE-BSD FRML MDRD: >60 ML/MIN/1.73M2
GLUCOSE BLD-MCNC: 129 MG/DL (ref 74–106)
GLUCOSE SERPL-MCNC: 113 MG/DL (ref 70–99)
GLUCOSE SERPL-MCNC: 115 MG/DL (ref 70–99)
GLUCOSE UR STRIP-MCNC: NEGATIVE MG/DL
HADV DNA NPH QL NAA+NON-PROBE: NOT DETECTED
HCO3 ABG: 33.2 MMOL/L (ref 22–26)
HCO3 ABG: 34.3 MMOL/L (ref 22–26)
HCO3 STD BLDA-SCNC: 33.2 MMOL/L (ref 22–26)
HCO3 STD BLDA-SCNC: 34.3 MMOL/L (ref 22–26)
HCO3 VENOUS: 36.8 MMOL/L (ref 24–30)
HCOV 229E RNA NPH QL NAA+NON-PROBE: NOT DETECTED
HCOV HKU1 RNA NPH QL NAA+NON-PROBE: NOT DETECTED
HCOV NL63 RNA NPH QL NAA+NON-PROBE: NOT DETECTED
HCOV OC43 RNA NPH QL NAA+NON-PROBE: NOT DETECTED
HCT VFR BLD AUTO: 59.1 % (ref 40.7–50.3)
HCT VFR BLD CALC: 53.4 % (ref 42–54)
HEMATOCRIT: 53.4 % (ref 42–54)
HEMOGLOBIN: 17.3 G/DL (ref 14–18)
HGB BLD-MCNC: 19.1 G/DL (ref 13–17)
HGB UR QL STRIP.AUTO: ABNORMAL
HMPV RNA NPH QL NAA+NON-PROBE: NOT DETECTED
HPIV1 RNA NPH QL NAA+NON-PROBE: NOT DETECTED
HPIV2 RNA NPH QL NAA+NON-PROBE: NOT DETECTED
HPIV3 RNA NPH QL NAA+NON-PROBE: NOT DETECTED
HPIV4 RNA NPH QL NAA+NON-PROBE: NOT DETECTED
IMM GRANULOCYTES # BLD AUTO: 0 K/UL (ref 0–0.3)
IMM GRANULOCYTES NFR BLD: 0 %
IMMATURE GRANULOCYTES ABS AUTO: 0.03 10^3/UL (ref 0–0.03)
IMMATURE GRANULOCYTES PCT AUTO: 1.1 % (ref 0–0.5)
INR PPP: 1.2
KETONES UR STRIP-MCNC: ABNORMAL MG/DL
LACTIC ACID, WHOLE BLOOD: 3.3 MMOL/L (ref 0.7–2.1)
LEUKOCYTE ESTERASE UR QL STRIP: ABNORMAL
LYMPHOCYTES  ABSOLUTE AUTO: 0.4 10^3/UL (ref 1.2–3.8)
LYMPHOCYTES NFR BLD: 0.52 K/UL (ref 1.1–3.7)
LYMPHOCYTES RELATIVE PERCENT: 13 % (ref 24–43)
M PNEUMO DNA NPH QL NAA+NON-PROBE: NOT DETECTED
MCH RBC QN AUTO: 32.4 PG (ref 25.2–33.5)
MCHC RBC AUTO-ENTMCNC: 32.3 G/DL (ref 28.4–34.8)
MCV RBC AUTO: 100.3 FL (ref 82.6–102.9)
MCV RBC: 100.8 FL (ref 80–94)
MEAN CORPUSCULAR HEMOGLOBIN: 32.6 PG (ref 25.9–34)
MEAN CORPUSCULAR HGB CONC: 32.4 G/DL (ref 29.9–35.2)
MEAN CORPUSCULAR VOLUME: 100.8 FL (ref 80–94)
MODE: ABNORMAL
MONOCYTES NFR BLD: 0.36 K/UL (ref 0.1–1.2)
MONOCYTES NFR BLD: 9 % (ref 3–12)
MORPHOLOGY: NORMAL
NEUTROPHILS NFR BLD: 77 % (ref 36–65)
NEUTS SEG NFR BLD: 3.08 K/UL (ref 1.5–8.1)
NITRITE UR QL STRIP: NEGATIVE
NRBC BLD-RTO: 0 PER 100 WBC
O2 DELIVERY DEVICE: ABNORMAL
O2 DELIVERY DEVICE: ABNORMAL
O2 MODE: (no result)
O2 MODE: (no result)
O2 SAT, VEN: 78.9 % (ref 60–85)
OSMOLALITY UR: 523 MOSM/KG (ref 80–1300)
PCO2, VEN: 71.5 MM HG (ref 39–55)
PH UR STRIP: 6 [PH] (ref 5–8)
PH VENOUS: 7.33 (ref 7.32–7.42)
PHOSPHATE SERPL-MCNC: 2.6 MG/DL (ref 2.5–4.5)
PLATELET # BLD AUTO: ABNORMAL K/UL (ref 138–453)
PLATELET # BLD: 143 10^3/UL (ref 150–450)
PLATELET COUNT: 143 10^3/UL (ref 150–450)
PLATELET, FLUORESCENCE: ABNORMAL K/UL (ref 138–453)
PO2 BLDA: 104 MMHG (ref 80–100)
PO2 BLDA: 74.3 MMHG (ref 80–100)
PO2, VEN: 45.7 MM HG (ref 30–50)
POC HCO3: 33.5 MMOL/L (ref 21–28)
POC HCO3: 36.2 MMOL/L (ref 21–28)
POC O2 SATURATION: 100 % (ref 94–98)
POC O2 SATURATION: 95.9 % (ref 94–98)
POC PCO2: 55.7 MM HG (ref 35–48)
POC PCO2: 67.9 MM HG (ref 35–48)
POC PH: 7.3 (ref 7.35–7.45)
POC PH: 7.42 (ref 7.35–7.45)
POC PO2: 498 MM HG (ref 83–108)
POC PO2: 82.2 MM HG (ref 83–108)
POSITIVE BASE EXCESS, ART: 3.4 MMOL/L (ref 0–3)
POSITIVE BASE EXCESS, ART: 8.6 MMOL/L (ref 0–3)
POSITIVE BASE EXCESS, VEN: 7.1 MMOL/L (ref 0–2)
POTASSIUM SERPL-SCNC: 4.7 MMOL/L (ref 3.7–5.3)
POTASSIUM SERPL-SCNC: 5.3 MMOL/L (ref 3.7–5.3)
POTASSIUM SERPL-SCNC: 5.8 MMOL/L (ref 3.7–5.3)
POTASSIUM SERPLBLD-SCNC: 5.2 MMOL/L (ref 3.5–5.1)
POTASSIUM, UR: 27.1 MMOL/L
POTASSIUM: 5.2 MMOL/L (ref 3.5–5.1)
PROCALCITONIN SERPL-MCNC: 0.14 NG/ML
PROT SERPL-MCNC: 6.2 G/DL (ref 6.4–8.3)
PROT UR STRIP-MCNC: ABNORMAL MG/DL
PROTHROMBIN TIME: 14.5 SEC (ref 11.7–14.9)
PUNCTURE SITE: (no result)
PUNCTURE SITE: (no result)
RATE: 16
RBC # BLD AUTO: 5.89 M/UL (ref 4.21–5.77)
RBC #/AREA URNS HPF: NORMAL /HPF (ref 0–2)
REASON FOR REJECTION: NORMAL
REASON FOR REJECTION: NORMAL
RED BLOOD COUNT: 5.3 10^6/UL (ref 4.7–6.1)
RSV RNA NPH QL NAA+NON-PROBE: NOT DETECTED
RV+EV RNA NPH QL NAA+NON-PROBE: DETECTED
SAMPLE SITE: ABNORMAL
SAMPLE SITE: ABNORMAL
SAO2 % BLDA: 91.9 %
SAO2 % BLDA: 98.3 %
SARS-COV-2 NAA: NOT DETECTED
SARS-COV-2 RNA NPH QL NAA+NON-PROBE: NOT DETECTED
SODIUM BLD-SCNC: 117 MMOL/L (ref 136–145)
SODIUM BLD-SCNC: 122 MMOL/L (ref 136–145)
SODIUM SERPL-SCNC: 127 MMOL/L (ref 135–144)
SODIUM SERPL-SCNC: 129 MMOL/L (ref 135–144)
SODIUM SERPL-SCNC: 131 MMOL/L (ref 135–144)
SODIUM UR-SCNC: <20 MMOL/L
SODIUM: 117 MMOL/L (ref 136–145)
SODIUM: 122 MMOL/L (ref 136–145)
SP GR UR STRIP: 1.03 (ref 1–1.03)
SPECIMEN DESCRIPTION: ABNORMAL
SPECIMEN SOURCE: NORMAL
SPECIMEN SOURCE: NORMAL
TROPONIN I SERPL HS-MCNC: 29 NG/L (ref 0–22)
TROPONIN I SERPL HS-MCNC: 34 NG/L (ref 0–22)
TROPONIN I SERPL HS-MCNC: NORMAL NG/L (ref 0–22)
UROBILINOGEN UR STRIP-ACNC: NORMAL EU/DL (ref 0–1)
WBC # BLD: 2.6 10^3/UL (ref 4–11)
WBC #/AREA URNS HPF: NORMAL /HPF (ref 0–5)
WBC OTHER # BLD: 4 K/UL (ref 3.5–11.3)
WHITE BLOOD COUNT: 2.6 10^3/UL (ref 4–11)
ZZ NTE CLEAN UP: ORDERED TEST: NORMAL
ZZ NTE CLEAN UP: ORDERED TEST: NORMAL

## 2023-10-09 PROCEDURE — 80053 COMPREHEN METABOLIC PANEL: CPT

## 2023-10-09 PROCEDURE — 93005 ELECTROCARDIOGRAM TRACING: CPT

## 2023-10-09 PROCEDURE — 94640 AIRWAY INHALATION TREATMENT: CPT

## 2023-10-09 PROCEDURE — 5A1945Z RESPIRATORY VENTILATION, 24-96 CONSECUTIVE HOURS: ICD-10-PCS | Performed by: INTERNAL MEDICINE

## 2023-10-09 PROCEDURE — 80048 BASIC METABOLIC PNL TOTAL CA: CPT

## 2023-10-09 PROCEDURE — 84300 ASSAY OF URINE SODIUM: CPT

## 2023-10-09 PROCEDURE — 85055 RETICULATED PLATELET ASSAY: CPT

## 2023-10-09 PROCEDURE — 6360000002 HC RX W HCPCS

## 2023-10-09 PROCEDURE — 94002 VENT MGMT INPAT INIT DAY: CPT

## 2023-10-09 PROCEDURE — 93010 ELECTROCARDIOGRAM REPORT: CPT | Performed by: INTERNAL MEDICINE

## 2023-10-09 PROCEDURE — 6370000000 HC RX 637 (ALT 250 FOR IP)

## 2023-10-09 PROCEDURE — 99291 CRITICAL CARE FIRST HOUR: CPT | Performed by: INTERNAL MEDICINE

## 2023-10-09 PROCEDURE — 83935 ASSAY OF URINE OSMOLALITY: CPT

## 2023-10-09 PROCEDURE — 87205 SMEAR GRAM STAIN: CPT

## 2023-10-09 PROCEDURE — 82805 BLOOD GASES W/O2 SATURATION: CPT

## 2023-10-09 PROCEDURE — 6360000002 HC RX W HCPCS: Performed by: STUDENT IN AN ORGANIZED HEALTH CARE EDUCATION/TRAINING PROGRAM

## 2023-10-09 PROCEDURE — 2580000003 HC RX 258

## 2023-10-09 PROCEDURE — 2500000003 HC RX 250 WO HCPCS

## 2023-10-09 PROCEDURE — 0202U NFCT DS 22 TRGT SARS-COV-2: CPT

## 2023-10-09 PROCEDURE — 36415 COLL VENOUS BLD VENIPUNCTURE: CPT

## 2023-10-09 PROCEDURE — 86140 C-REACTIVE PROTEIN: CPT

## 2023-10-09 PROCEDURE — 36600 WITHDRAWAL OF ARTERIAL BLOOD: CPT

## 2023-10-09 PROCEDURE — 85025 COMPLETE CBC W/AUTO DIFF WBC: CPT

## 2023-10-09 PROCEDURE — 2580000003 HC RX 258: Performed by: STUDENT IN AN ORGANIZED HEALTH CARE EDUCATION/TRAINING PROGRAM

## 2023-10-09 PROCEDURE — 83880 ASSAY OF NATRIURETIC PEPTIDE: CPT

## 2023-10-09 PROCEDURE — 82803 BLOOD GASES ANY COMBINATION: CPT

## 2023-10-09 PROCEDURE — 80051 ELECTROLYTE PANEL: CPT

## 2023-10-09 PROCEDURE — 83605 ASSAY OF LACTIC ACID: CPT

## 2023-10-09 PROCEDURE — 6370000000 HC RX 637 (ALT 250 FOR IP): Performed by: STUDENT IN AN ORGANIZED HEALTH CARE EDUCATION/TRAINING PROGRAM

## 2023-10-09 PROCEDURE — 84133 ASSAY OF URINE POTASSIUM: CPT

## 2023-10-09 PROCEDURE — 84145 PROCALCITONIN (PCT): CPT

## 2023-10-09 PROCEDURE — 84100 ASSAY OF PHOSPHORUS: CPT

## 2023-10-09 PROCEDURE — 87070 CULTURE OTHR SPECIMN AEROBIC: CPT

## 2023-10-09 PROCEDURE — 87641 MR-STAPH DNA AMP PROBE: CPT

## 2023-10-09 PROCEDURE — 81001 URINALYSIS AUTO W/SCOPE: CPT

## 2023-10-09 PROCEDURE — 93005 ELECTROCARDIOGRAM TRACING: CPT | Performed by: STUDENT IN AN ORGANIZED HEALTH CARE EDUCATION/TRAINING PROGRAM

## 2023-10-09 PROCEDURE — 87040 BLOOD CULTURE FOR BACTERIA: CPT

## 2023-10-09 PROCEDURE — 82570 ASSAY OF URINE CREATININE: CPT

## 2023-10-09 PROCEDURE — 94761 N-INVAS EAR/PLS OXIMETRY MLT: CPT

## 2023-10-09 PROCEDURE — 2000000000 HC ICU R&B

## 2023-10-09 PROCEDURE — 2500000003 HC RX 250 WO HCPCS: Performed by: STUDENT IN AN ORGANIZED HEALTH CARE EDUCATION/TRAINING PROGRAM

## 2023-10-09 PROCEDURE — 71045 X-RAY EXAM CHEST 1 VIEW: CPT

## 2023-10-09 PROCEDURE — HZ2ZZZZ DETOXIFICATION SERVICES FOR SUBSTANCE ABUSE TREATMENT: ICD-10-PCS | Performed by: INTERNAL MEDICINE

## 2023-10-09 PROCEDURE — 2700000000 HC OXYGEN THERAPY PER DAY

## 2023-10-09 PROCEDURE — 84484 ASSAY OF TROPONIN QUANT: CPT

## 2023-10-09 PROCEDURE — 85610 PROTHROMBIN TIME: CPT

## 2023-10-09 RX ORDER — DOXYCYCLINE HYCLATE 100 MG
100 TABLET ORAL EVERY 12 HOURS SCHEDULED
Status: COMPLETED | OUTPATIENT
Start: 2023-10-09 | End: 2023-10-13

## 2023-10-09 RX ORDER — PROPOFOL 10 MG/ML
5-50 INJECTION, EMULSION INTRAVENOUS CONTINUOUS
Status: DISCONTINUED | OUTPATIENT
Start: 2023-10-09 | End: 2023-10-11

## 2023-10-09 RX ORDER — FENTANYL CITRATE 50 UG/ML
INJECTION, SOLUTION INTRAMUSCULAR; INTRAVENOUS
Status: COMPLETED
Start: 2023-10-09 | End: 2023-10-09

## 2023-10-09 RX ORDER — ACETAMINOPHEN 325 MG/1
650 TABLET ORAL EVERY 6 HOURS PRN
Status: DISCONTINUED | OUTPATIENT
Start: 2023-10-09 | End: 2023-10-14 | Stop reason: HOSPADM

## 2023-10-09 RX ORDER — SODIUM CHLORIDE 0.9 % (FLUSH) 0.9 %
5-40 SYRINGE (ML) INJECTION EVERY 12 HOURS SCHEDULED
Status: DISCONTINUED | OUTPATIENT
Start: 2023-10-09 | End: 2023-10-14 | Stop reason: HOSPADM

## 2023-10-09 RX ORDER — PREDNISONE 50 MG/1
50 TABLET ORAL DAILY
Status: DISCONTINUED | OUTPATIENT
Start: 2023-10-09 | End: 2023-10-14 | Stop reason: HOSPADM

## 2023-10-09 RX ORDER — IPRATROPIUM BROMIDE AND ALBUTEROL SULFATE 2.5; .5 MG/3ML; MG/3ML
1 SOLUTION RESPIRATORY (INHALATION)
Status: DISCONTINUED | OUTPATIENT
Start: 2023-10-09 | End: 2023-10-10

## 2023-10-09 RX ORDER — ACETAMINOPHEN 650 MG/1
650 SUPPOSITORY RECTAL EVERY 6 HOURS PRN
Status: DISCONTINUED | OUTPATIENT
Start: 2023-10-09 | End: 2023-10-14 | Stop reason: HOSPADM

## 2023-10-09 RX ORDER — FUROSEMIDE 10 MG/ML
20 INJECTION INTRAMUSCULAR; INTRAVENOUS ONCE
Status: COMPLETED | OUTPATIENT
Start: 2023-10-09 | End: 2023-10-09

## 2023-10-09 RX ORDER — SODIUM CHLORIDE 0.9 % (FLUSH) 0.9 %
5-40 SYRINGE (ML) INJECTION PRN
Status: DISCONTINUED | OUTPATIENT
Start: 2023-10-09 | End: 2023-10-14 | Stop reason: HOSPADM

## 2023-10-09 RX ORDER — ONDANSETRON 4 MG/1
4 TABLET, ORALLY DISINTEGRATING ORAL EVERY 8 HOURS PRN
Status: DISCONTINUED | OUTPATIENT
Start: 2023-10-09 | End: 2023-10-14 | Stop reason: HOSPADM

## 2023-10-09 RX ORDER — ENOXAPARIN SODIUM 100 MG/ML
40 INJECTION SUBCUTANEOUS DAILY
Status: DISCONTINUED | OUTPATIENT
Start: 2023-10-09 | End: 2023-10-10

## 2023-10-09 RX ORDER — ALBUTEROL SULFATE 2.5 MG/3ML
2.5 SOLUTION RESPIRATORY (INHALATION) 2 TIMES DAILY
Status: DISCONTINUED | OUTPATIENT
Start: 2023-10-10 | End: 2023-10-10

## 2023-10-09 RX ORDER — ONDANSETRON 2 MG/ML
4 INJECTION INTRAMUSCULAR; INTRAVENOUS EVERY 6 HOURS PRN
Status: DISCONTINUED | OUTPATIENT
Start: 2023-10-09 | End: 2023-10-14 | Stop reason: HOSPADM

## 2023-10-09 RX ORDER — FENTANYL CITRATE 50 UG/ML
100 INJECTION, SOLUTION INTRAMUSCULAR; INTRAVENOUS ONCE
Status: COMPLETED | OUTPATIENT
Start: 2023-10-09 | End: 2023-10-09

## 2023-10-09 RX ORDER — SODIUM CHLORIDE 9 MG/ML
INJECTION, SOLUTION INTRAVENOUS PRN
Status: DISCONTINUED | OUTPATIENT
Start: 2023-10-09 | End: 2023-10-14 | Stop reason: HOSPADM

## 2023-10-09 RX ORDER — POLYETHYLENE GLYCOL 3350 17 G/17G
17 POWDER, FOR SOLUTION ORAL DAILY PRN
Status: DISCONTINUED | OUTPATIENT
Start: 2023-10-09 | End: 2023-10-14 | Stop reason: HOSPADM

## 2023-10-09 RX ADMIN — Medication 50 MCG/HR: at 06:35

## 2023-10-09 RX ADMIN — SODIUM CHLORIDE 50 ML: 900 INJECTION, SOLUTION INTRAVENOUS at 01:45

## 2023-10-09 RX ADMIN — DOXYCYCLINE HYCLATE 100 MG: 100 TABLET, COATED ORAL at 21:24

## 2023-10-09 RX ADMIN — PROPOFOL 25 MCG/KG/MIN: 10 INJECTION, EMULSION INTRAVENOUS at 19:31

## 2023-10-09 RX ADMIN — SODIUM CHLORIDE, PRESERVATIVE FREE 20 MG: 5 INJECTION INTRAVENOUS at 10:20

## 2023-10-09 RX ADMIN — PROPOFOL 15 MCG/KG/MIN: 10 INJECTION, EMULSION INTRAVENOUS at 13:38

## 2023-10-09 RX ADMIN — PROPOFOL 20.61 MG: 10 INJECTION, EMULSION INTRAVENOUS at 01:45

## 2023-10-09 RX ADMIN — PROPOFOL 50 MCG/KG/MIN: 10 INJECTION, EMULSION INTRAVENOUS at 06:36

## 2023-10-09 RX ADMIN — SODIUM CHLORIDE: 9 INJECTION, SOLUTION INTRAVENOUS at 10:25

## 2023-10-09 RX ADMIN — SODIUM CHLORIDE, PRESERVATIVE FREE 20 MG: 5 INJECTION INTRAVENOUS at 21:24

## 2023-10-09 RX ADMIN — PROPOFOL 50 MCG/KG/MIN: 10 INJECTION, EMULSION INTRAVENOUS at 06:55

## 2023-10-09 RX ADMIN — Medication 50 MCG/HR: at 07:11

## 2023-10-09 RX ADMIN — DOXYCYCLINE HYCLATE 100 MG: 100 TABLET, COATED ORAL at 10:20

## 2023-10-09 RX ADMIN — PREDNISONE 50 MG: 20 TABLET ORAL at 09:19

## 2023-10-09 RX ADMIN — IPRATROPIUM BROMIDE AND ALBUTEROL SULFATE 1 DOSE: 2.5; .5 SOLUTION RESPIRATORY (INHALATION) at 15:42

## 2023-10-09 RX ADMIN — FENTANYL CITRATE 100 MCG: 50 INJECTION, SOLUTION INTRAMUSCULAR; INTRAVENOUS at 06:38

## 2023-10-09 RX ADMIN — ETOMIDATE 20 MG: 2 INJECTION INTRAVENOUS at 01:15

## 2023-10-09 RX ADMIN — FUROSEMIDE 20 MG: 10 INJECTION, SOLUTION INTRAMUSCULAR; INTRAVENOUS at 14:54

## 2023-10-09 RX ADMIN — PROPOFOL 41.22 MG: 10 INJECTION, EMULSION INTRAVENOUS at 03:51

## 2023-10-09 RX ADMIN — ENOXAPARIN SODIUM 40 MG: 100 INJECTION SUBCUTANEOUS at 09:19

## 2023-10-09 RX ADMIN — IPRATROPIUM BROMIDE AND ALBUTEROL SULFATE 1 DOSE: 2.5; .5 SOLUTION RESPIRATORY (INHALATION) at 11:51

## 2023-10-09 RX ADMIN — IPRATROPIUM BROMIDE AND ALBUTEROL SULFATE 1 DOSE: 2.5; .5 SOLUTION RESPIRATORY (INHALATION) at 08:41

## 2023-10-09 RX ADMIN — IPRATROPIUM BROMIDE AND ALBUTEROL SULFATE 1 DOSE: 2.5; .5 SOLUTION RESPIRATORY (INHALATION) at 20:29

## 2023-10-09 RX ADMIN — SODIUM CHLORIDE, PRESERVATIVE FREE 10 ML: 5 INJECTION INTRAVENOUS at 21:25

## 2023-10-09 ASSESSMENT — PULMONARY FUNCTION TESTS
PIF_VALUE: 31
PIF_VALUE: 31
PIF_VALUE: 34
PIF_VALUE: 32
PIF_VALUE: 35
PIF_VALUE: 34

## 2023-10-09 NOTE — XR_ITS
The [address] [address] 
     [phone] 
  
  
Patient Name: 
EDGAR REYES 
  
MRN: [id number]    YOB: 1960    Sex: M 
Assigned Patient Location: ICU 
Current Patient Location: ICU 
Accession/Order Number: [id number] 
Exam Date: 10/09/2023  01:36    Report Date: 10/09/2023  02:01 
  
At the request of: 
ANILA FLAHERTY   
  
Procedure:  XR chest 1V 
  
CHEST X-RAY 
  
HISTORY:  
Chest pain 
  
COMPARISON: 
 10/8/2023 
  
TECHNIQUE:  
1 view chest is submitted for review. 
  
FINDINGS: 
Endotracheal tube tip at the level the clavicle.  
The lungs are adequately expanded.  
No effusion.  
There is prominence of interstitial lung markings seen throughout bilateral  
lungs. 
The cardiac silhouette is enlarged.  
Pulmonary vascularity is mildly prominent. 
Osseous structures do not demonstrate any acute abnormality. . 
  
ORDER #: [id number] XR/XR chest 1V  
IMPRESSION:  
1. Cardiomegaly.  
2. Prominence of interstitial lung markings. Please correlate for fluid   
overload, viral pneumonia and/or chronic interstitial lung disease.  
   
   
Electronically authenticated by: PARIS BADILLO   Date: 10/09/2023  02:01

## 2023-10-09 NOTE — W.PM.TELEPN
Progress Note: Subjective
Subjective
Interval history: 
CC: Shortness of breath, weakness

HPI: This is a 62 years old white male with known COPD, smoker, who presents with above complaints.  Patient is a poor historian and cannot provide much information.  Majority of the data obtained from conversation with ED physician.  Evaluation in 
the emergency room revealed signs of CO2 retention with respiratory acidosis as well as hyponatremia with sodium level 121.  Patient agreed to stay in the hospital and agreed to use BiPAP.  Currently resting.

Exam
Narrative
Exam Narrative: 

 Physical Exam:
Not in distress, very somnolent, responding to pain stimuli
 Head - atraumatic, eyes - pupils equal, round, reactive to light, extra ocular movement intact, MMM
 Neck - supple, thyroid not enlarged, LN not palpated
 Lungs -coarse breath sounds bilaterally, wheezing
 CVS - heart sounds S1, S2, no additional murmurs gallop, regular rate and rhythm
 Gastrointestinal?abdomen is soft, non-tender, non-distended, no organomegaly, positive bowel sounds
 Extremities no clubbing, cyanosis or edema
 Neurological?cranial nerve II?XII grossly intact, no meningeal signs, no cerebellar signs, no sensory deficit
 Musculoskeletal - DJD related changes in multiple joints, no effusions, ROM preserved
 Dermatological - the skin dry, warm, no rashes
Constitutional
Vital Signs, click to edit/add: 
Last Vital Signs

Temp  98.7 F   10/08/23 23:02
Pulse  88   10/08/23 23:58
Resp  24   10/08/23 23:10
BP  157/99 H  10/08/23 23:02
Pulse Ox  91 L  10/08/23 23:58
O2 Del Method  BIPAP  10/08/23 23:13
O2 Flow Rate  4   10/08/23 17:39
FiO2  40   10/08/23 23:13



Progress Note: Objective
Labs
Labs: 
Short CBC

  10/08/23 Range/Units
  17:25 
WBC  5.6  (4.0-11.0)  10^3/uL
Hgb  18.6 H  (14.0-18.0)  g/dL
Hct  57.4 H  (42.0-54.0)  %
Plt Count  210  (150-450)  10^3/uL

BMP

  10/08/23
  17:25
Sodium  121 L*
Potassium  4.8
Chloride  84 L*
Carbon Dioxide  33.2 H
BUN  33.0 H
Creatinine  1.25
Glucose  128 H
Calcium  8.5

Liver Function

  10/08/23 Range/Units
  17:25 
Total Bilirubin  0.9  (0.2-1.0)  mg/dL
AST  64 H  (15-37)  U/L
ALT  66 H  (16-63)  U/L
Alkaline Phosphatase  74  ()  U/L
Albumin  3.7  (3.4-5.0)  g/dL

Urine

  10/08/23 Range/Units
  21:10 
Urine Color  Yellow  (YELLOW)  
Urine Clarity  Clear  (CLEAR)  
Urine pH  5.5  (5.0-9.0)  
Ur Specific Gravity  >=1.030 A  (1.005-1.025)  
Urine Protein  >=300 A  (NEG/TRACE)  mg/dL
Urine Glucose (UA)  Negative  (NEGATIVE)  mg/dL



Progress Note: A&P
Assessment and Plan
(1) Acute exacerbation of chronic obstructive pulmonary disease: 
      Assessment and Plan: 
COPD exacerbation with respiratory failure combined hypoxemic and hypercapnia
 - admit to inpatient for close monitoring
 - O2 supplementation
 - RT assessment
 - inhaled and systemic steroids
 - inhaled bronchodilators
 - will order sputum Cx and Gramm stain
- empiric, broad spectrum ABxs
 - Continue with BiPAP for CO2 retention.  I am planning to recheck ABGs within 2 hours of BiPAP treatment.  If no improvement?intubation going to be considered
 - F/U Cxs - tailor ABxs accordingly
 - Acute respiratory acidosis?as above

(2) Tobacco abuse: 
      Assessment and Plan: 
I am going to start patient on nicotine patch
(3) Hyponatremia: 
      Assessment and Plan: 
Hyponatremia?reason unclear
 Suspect chronic
 We will check urine and serum osmolarity
 We will check random urine creatinine and protein
 I am going to check urine sodium excretion
 Going to check sodium level every 4 hours.  Goal of correction?6-8 mEq / 24 hours
(4) Elevated troponin: 
      Assessment and Plan: 
Most probably demand ischemia
Continue to trend
Going to order echocardiogram

Plan

As the provider for the telehealth service, I attest that I introduced myself to the patient, provided my credentials, disclosed by location and determined that based on a review of the patient's chart and discussion with members of the patient's 
treatment team, telemedicine via real-time, 2 way, and interactive audio and video platform is an appropriate and effective means of providing the service. ?The patient and I mutually agree this visit is appropriate for telemedicine. ?The virtual 
encounter was taken place from? [place], [place]. ?The encounter took approximately 35 minutes. ?The nurse was present during the entire time and I was able to move the stethoscope in appropriate directions. ?The patient was evaluated at the Hospital
?
Portions of this note may be dictated using Dragon voice recognition software. Variances in spelling and vocabulary are possible and unintentional. Not all errors may be caught and/or corrected. Please notify the author if any discrepancies are 
noted and/or if the meaning of any statement is unclear.?
?
Patient verbally consented for treatment via video visit with patient currently located at the [facility] and provider located in [place].

Telemedicine Attestation
Telemedicine Attestation
I conducted this encounter from [[place]] via secure live, face-to-face video conference with the patient, located at THE [facility] with [COPD exacerbation].
Prior to the interview, the risks and benefits of telemedicine were discussed with the patient and verbal consent was obtained.

## 2023-10-09 NOTE — PC.NURSE
report called to ICU floor. Pt  left stable in care of Critical care transport team. Pt daughter Gabrielle updated on transfer information

## 2023-10-09 NOTE — H&P
other team members and physicians at least 27  Min so far today, excluding procedures.         Nixon Underwood MD  10/9/2023  8:48 AM

## 2023-10-09 NOTE — PLAN OF CARE
Problem: Safety - Medical Restraint  Goal: Remains free of injury from restraints (Restraint for Interference with Medical Device)  Description: INTERVENTIONS:  1. Determine that other, less restrictive measures have been tried or would not be effective before applying the restraint  2. Evaluate the patient's condition at the time of restraint application  3. Inform patient/family regarding the reason for restraint  4.  Q2H: Monitor safety, psychosocial status, comfort, nutrition and hydration  Recent Flowsheet Documentation  Taken 10/9/2023 1400 by Silviano Bates RN  Remains free of injury from restraints (restraint for interference with medical device): Every 2 hours: Monitor safety, psychosocial status, comfort, nutrition and hydration  Taken 10/9/2023 1200 by Silviano Bates RN  Remains free of injury from restraints (restraint for interference with medical device): Every 2 hours: Monitor safety, psychosocial status, comfort, nutrition and hydration  Taken 10/9/2023 1000 by Silviano Bates RN  Remains free of injury from restraints (restraint for interference with medical device): Every 2 hours: Monitor safety, psychosocial status, comfort, nutrition and hydration  Taken 10/9/2023 0800 by Silviano Bates RN  Remains free of injury from restraints (restraint for interference with medical device): Every 2 hours: Monitor safety, psychosocial status, comfort, nutrition and hydration  Taken 10/9/2023 0725 by Elmira Pineda RN  Remains free of injury from restraints (restraint for interference with medical device): Every 2 hours: Monitor safety, psychosocial status, comfort, nutrition and hydration  Taken 10/9/2023 0630 by Elmira Pineda RN  Remains free of injury from restraints (restraint for interference with medical device): Every 2 hours: Monitor safety, psychosocial status, comfort, nutrition and hydration

## 2023-10-09 NOTE — H&P
Critical Care - History and Physical Examination    Patient's name:  Saray Espitia  Medical Record Number: [id number]  Patient's account/billing number: [de-identified]  Patient's YOB: 1960  Age: 58 y.o. Date of Admission: 10/9/2023  6:00 AM  Reason of ICU admission:   Date of History and Physical Examination: 10/9/2023      Primary Care Physician: No primary care provider on file. Attending Physician:    Code Status: Full Code    Chief complaint: SOB    Reason for ICU admission: Respiratory distress requiring intubation      History Of Present Illness:   History was obtained from chart review. Saray Espitia is a 58 y.o. past medical history of COPD, otherwise unknown medical history per documentation received, presented to [facility] due to concern for shortness of breath and fatigue. There is also concern for associated weight loss of 30 pounds, although no time period has been specified. Vitals at outside hospital T 98.7 heart rate 114, respiratory rate 20, /106, SPO2 68% on room air. Patient was given multiple breathing treatments, Solu-Medrol 125 mg IV, azithromycin, Rocephin, budesonide inhaler and subsequently intubated due to respiratory compromise. Labs  WBC 5.6, Hgb 18.6, , D-dimer 2.49, INR 1.14, ABG pH 7.193, PCO2 88.4, PO2 one 0.1, HCO3 33.9   (1725), K4.8, CL 84, CO2 33.2, AG 8.6, BUN 33, CR 1.25, lactate 3.0, proBNP 11,425, troponin 149.8 COVID test negative. Urinalysis negative. Chest CT negative for PE.  ECG demonstrating sinus tachycardia extreme axis deviation, T wave inversions in V2, V3, V4. No acute ischemic findings    This patient was initially admitted to the ICU at the outlying facility, however due to worsening blood gases was transferred to [facility]. Past Medical History:  No past medical history on file. Past Surgical History:  No past surgical history on file. Allergies:     Allergies   Allergen Reactions

## 2023-10-09 NOTE — PC.NURSE
spoke with patients daughter Gabrielle regarding intubation and plans to transfer pt to tertiary facility

## 2023-10-09 NOTE — CARE COORDINATION
Case Management Assessment  Initial Evaluation    Date/Time of Evaluation: 10/9/2023 5:36 PM  Assessment Completed by: Paula Huffman RN    If patient is discharged prior to next notation, then this note serves as note for discharge by case management. Patient Name: Brandin Zurita                   YOB: 1960  Diagnosis: Acute exacerbation of chronic obstructive pulmonary disease (COPD) ([address]) [J44.1]                   Date / Time: 10/9/2023  6:00 AM    Patient Admission Status: Inpatient   Readmission Risk (Low < 19, Mod (19-27), High > 27): No data recorded  Current PCP: No primary care provider on file. PCP verified by CM? (P) Yes (Dr. Penelope Tony)    Chart Reviewed: Yes      History Provided by: (P) Child/Family (Pt's daughter, Ashish Choudhary)  Patient Orientation: (P) Sedated    Patient Cognition: (P) Other (see comment) (Intubated,)    Hospitalization in the last 30 days (Readmission):  No    If yes, Readmission Assessment in  Navigator will be completed. Advance Directives:      Code Status: Full Code   Patient's Primary Decision Maker is:        Discharge Planning:    Patient lives with:   Type of Home:    Primary Care Giver:    Patient Support Systems include: (P) Children   Current Financial resources: (P) Other (Comment) ([employer] ID # [id number], Health plan # [id number])  Current community resources:    Current services prior to admission: (P) None            Current DME:              Type of Home Care services:       ADLS  Prior functional level: (P) Independent in ADLs/IADLs  Current functional level: (P) Assistance with the following:, Bathing, Dressing, Toileting, Mobility, Other (see comment) (Intubated/ Sedated)    PT AM-PAC:   /24  OT AM-PAC:   /24    Family can provide assistance at DC: Would you like Case Management to discuss the discharge plan with any other family members/significant others, and if so, who?     Plans to Return to Present Housing: (P) Unknown at present  Other Identified Issues/Barriers to RETURNING to current housing: POC dependent on pt progress  Potential Assistance needed at discharge:              Potential DME:    Patient expects to discharge to: (P) Unknown  Plan for transportation at discharge:      Financial    Payor: /     Does insurance require precert for SNF: provided insurance information to Cesar in [address] [place] [address] 231 ID # [id number], Health plan # [id number]    Potential assistance Purchasing Medications: (P) Yes (dependent on insurance verification)  Meds-to-Beds request: Yes    No Pharmacies Listed    Notes:    Factors facilitating achievement of predicted outcomes: Family support    Barriers to discharge: Medical complications    Additional Case Management Notes: I/S FiO2 50%, PEEP of 8, follows commands uppers only. Has PCP in [facility] information provided to registration. POC dependent on pt progress    Pt's daughter provided insurance information from card she scanned:   [facility] ID # [id number], Health plan # [id number]  Pt sees Dr. Penelope Tony in [address] [place] [address] in registration of above information     The Plan for Transition of Care is related to the following treatment goals of Acute exacerbation of chronic obstructive pulmonary disease (COPD) ([address]) [D15.5]    IF APPLICABLE: The Patient and/or patient representative Denae Rogers and his family were provided with a choice of provider and agrees with the discharge plan. Freedom of choice list with basic dialogue that supports the patient's individualized plan of care/goals and shares the quality data associated with the providers was provided to: (P) Patient Representative   Patient Representative Name: (P) pt's daughter, Ashish Choudhary     The Patient and/or Patient Representative Agree with the Discharge Plan?  (P) Yes (POC dependent on pt progress)    Paula Huffman RN  Case Management Department  Ph: [phone] Fax: [phone]

## 2023-10-10 PROBLEM — I21.4 NSTEMI (NON-ST ELEVATED MYOCARDIAL INFARCTION) (HCC): Status: ACTIVE | Noted: 2023-10-10

## 2023-10-10 LAB
ALLEN TEST: POSITIVE
ANION GAP SERPL CALCULATED.3IONS-SCNC: 8 MMOL/L (ref 9–16)
ANTI-XA UNFRAC HEPARIN: 0.1 IU/L
ANTI-XA UNFRAC HEPARIN: <0.1 IU/L
ANTI-XA UNFRAC HEPARIN: <0.1 IU/L
BASOPHILS # BLD: <0.03 K/UL (ref 0–0.2)
BASOPHILS NFR BLD: 0 % (ref 0–2)
BNP SERPL-MCNC: 948 PG/ML
BUN SERPL-MCNC: 15 MG/DL (ref 8–23)
CALCIUM SERPL-MCNC: 8.8 MG/DL (ref 8.6–10.4)
CHLORIDE SERPL-SCNC: 90 MMOL/L (ref 98–107)
CO2 SERPL-SCNC: 34 MMOL/L (ref 20–31)
CREAT SERPL-MCNC: 0.6 MG/DL (ref 0.7–1.2)
EKG ATRIAL RATE: 77 BPM
EKG ATRIAL RATE: 79 BPM
EKG P AXIS: 23 DEGREES
EKG P AXIS: 30 DEGREES
EKG P-R INTERVAL: 132 MS
EKG P-R INTERVAL: 134 MS
EKG Q-T INTERVAL: 394 MS
EKG Q-T INTERVAL: 398 MS
EKG QRS DURATION: 90 MS
EKG QRS DURATION: 90 MS
EKG QTC CALCULATION (BAZETT): 450 MS
EKG QTC CALCULATION (BAZETT): 451 MS
EKG R AXIS: 10 DEGREES
EKG R AXIS: 3 DEGREES
EKG T AXIS: 38 DEGREES
EKG T AXIS: 47 DEGREES
EKG VENTRICULAR RATE: 77 BPM
EKG VENTRICULAR RATE: 79 BPM
EOSINOPHIL # BLD: <0.03 K/UL (ref 0–0.44)
EOSINOPHILS RELATIVE PERCENT: 0 % (ref 1–4)
ERYTHROCYTE [DISTWIDTH] IN BLOOD BY AUTOMATED COUNT: 13.6 % (ref 11.8–14.4)
ERYTHROCYTE [DISTWIDTH] IN BLOOD BY AUTOMATED COUNT: 13.9 % (ref 11.8–14.4)
FIO2: 40
GFR SERPL CREATININE-BSD FRML MDRD: >60 ML/MIN/1.73M2
GLUCOSE BLD-MCNC: 142 MG/DL (ref 75–110)
GLUCOSE BLD-MCNC: 99 MG/DL (ref 74–100)
GLUCOSE SERPL-MCNC: 85 MG/DL (ref 74–99)
HCT VFR BLD AUTO: 56.9 % (ref 40.7–50.3)
HCT VFR BLD AUTO: 57 % (ref 40.7–50.3)
HGB BLD-MCNC: 17.9 G/DL (ref 13–17)
HGB BLD-MCNC: 18.2 G/DL (ref 13–17)
IMM GRANULOCYTES # BLD AUTO: 0.03 K/UL (ref 0–0.3)
IMM GRANULOCYTES NFR BLD: 0 %
INR PPP: 1.1
LYMPHOCYTES NFR BLD: 1.37 K/UL (ref 1.1–3.7)
LYMPHOCYTES RELATIVE PERCENT: 19 % (ref 24–43)
MCH RBC QN AUTO: 32 PG (ref 25.2–33.5)
MCH RBC QN AUTO: 32.6 PG (ref 25.2–33.5)
MCHC RBC AUTO-ENTMCNC: 31.4 G/DL (ref 28.4–34.8)
MCHC RBC AUTO-ENTMCNC: 32 G/DL (ref 28.4–34.8)
MCV RBC AUTO: 100 FL (ref 82.6–102.9)
MCV RBC AUTO: 103.8 FL (ref 82.6–102.9)
MONOCYTES NFR BLD: 1.19 K/UL (ref 0.1–1.2)
MONOCYTES NFR BLD: 16 % (ref 3–12)
MRSA, DNA, NASAL: NEGATIVE
NEUTROPHILS NFR BLD: 65 % (ref 36–65)
NEUTS SEG NFR BLD: 4.78 K/UL (ref 1.5–8.1)
NRBC BLD-RTO: 0 PER 100 WBC
NRBC BLD-RTO: 0 PER 100 WBC
OSMOLALITY UR: 412 MOSM/KG (ref 80–1300)
OSMOLALITY, URINE: 446 MOSMOL/KG
PARTIAL THROMBOPLASTIN TIME: 26.5 SEC (ref 23–36.5)
PLATELET # BLD AUTO: 145 K/UL (ref 138–453)
PLATELET # BLD AUTO: 173 K/UL (ref 138–453)
PMV BLD AUTO: 9.4 FL (ref 8.1–13.5)
PMV BLD AUTO: 9.4 FL (ref 8.1–13.5)
POC HCO3: 39.2 MMOL/L (ref 21–28)
POC O2 SATURATION: 96.1 % (ref 94–98)
POC PCO2: 59.2 MM HG (ref 35–48)
POC PH: 7.43 (ref 7.35–7.45)
POC PO2: 83.7 MM HG (ref 83–108)
POSITIVE BASE EXCESS, ART: 10.9 MMOL/L (ref 0–3)
POTASSIUM SERPL-SCNC: 4.5 MMOL/L (ref 3.7–5.3)
PROTHROMBIN TIME: 13.8 SEC (ref 11.7–14.9)
RBC # BLD AUTO: 5.49 M/UL (ref 4.21–5.77)
RBC # BLD AUTO: 5.69 M/UL (ref 4.21–5.77)
RBC # BLD: ABNORMAL 10*6/UL
REASON FOR REJECTION: NORMAL
SODIUM SERPL-SCNC: 132 MMOL/L (ref 136–145)
SODIUM UR-SCNC: <20 MMOL/L
SPECIMEN DESCRIPTION: NORMAL
SPECIMEN SOURCE: NORMAL
TROPONIN I SERPL HS-MCNC: 34 NG/L (ref 0–22)
TROPONIN I SERPL HS-MCNC: 41 NG/L (ref 0–22)
TROPONIN I SERPL HS-MCNC: 60 NG/L (ref 0–22)
TSH SERPL DL<=0.05 MIU/L-ACNC: 2.27 UIU/ML (ref 0.3–5)
WBC OTHER # BLD: 6.6 K/UL (ref 3.5–11.3)
WBC OTHER # BLD: 7.4 K/UL (ref 3.5–11.3)
ZZ NTE CLEAN UP: ORDERED TEST: NORMAL

## 2023-10-10 PROCEDURE — 94003 VENT MGMT INPAT SUBQ DAY: CPT

## 2023-10-10 PROCEDURE — 2580000003 HC RX 258: Performed by: STUDENT IN AN ORGANIZED HEALTH CARE EDUCATION/TRAINING PROGRAM

## 2023-10-10 PROCEDURE — 93010 ELECTROCARDIOGRAM REPORT: CPT | Performed by: INTERNAL MEDICINE

## 2023-10-10 PROCEDURE — 2000000000 HC ICU R&B

## 2023-10-10 PROCEDURE — 94640 AIRWAY INHALATION TREATMENT: CPT

## 2023-10-10 PROCEDURE — 99223 1ST HOSP IP/OBS HIGH 75: CPT | Performed by: INTERNAL MEDICINE

## 2023-10-10 PROCEDURE — 36600 WITHDRAWAL OF ARTERIAL BLOOD: CPT

## 2023-10-10 PROCEDURE — 82947 ASSAY GLUCOSE BLOOD QUANT: CPT

## 2023-10-10 PROCEDURE — 84443 ASSAY THYROID STIM HORMONE: CPT

## 2023-10-10 PROCEDURE — 99291 CRITICAL CARE FIRST HOUR: CPT | Performed by: INTERNAL MEDICINE

## 2023-10-10 PROCEDURE — 6360000002 HC RX W HCPCS: Performed by: INTERNAL MEDICINE

## 2023-10-10 PROCEDURE — 94761 N-INVAS EAR/PLS OXIMETRY MLT: CPT

## 2023-10-10 PROCEDURE — 83935 ASSAY OF URINE OSMOLALITY: CPT

## 2023-10-10 PROCEDURE — 2580000003 HC RX 258

## 2023-10-10 PROCEDURE — 6370000000 HC RX 637 (ALT 250 FOR IP): Performed by: STUDENT IN AN ORGANIZED HEALTH CARE EDUCATION/TRAINING PROGRAM

## 2023-10-10 PROCEDURE — 85027 COMPLETE CBC AUTOMATED: CPT

## 2023-10-10 PROCEDURE — 85610 PROTHROMBIN TIME: CPT

## 2023-10-10 PROCEDURE — 80048 BASIC METABOLIC PNL TOTAL CA: CPT

## 2023-10-10 PROCEDURE — 84484 ASSAY OF TROPONIN QUANT: CPT

## 2023-10-10 PROCEDURE — 84300 ASSAY OF URINE SODIUM: CPT

## 2023-10-10 PROCEDURE — 82803 BLOOD GASES ANY COMBINATION: CPT

## 2023-10-10 PROCEDURE — 6360000002 HC RX W HCPCS

## 2023-10-10 PROCEDURE — 85730 THROMBOPLASTIN TIME PARTIAL: CPT

## 2023-10-10 PROCEDURE — 2500000003 HC RX 250 WO HCPCS: Performed by: STUDENT IN AN ORGANIZED HEALTH CARE EDUCATION/TRAINING PROGRAM

## 2023-10-10 PROCEDURE — 85025 COMPLETE CBC W/AUTO DIFF WBC: CPT

## 2023-10-10 PROCEDURE — 83880 ASSAY OF NATRIURETIC PEPTIDE: CPT

## 2023-10-10 PROCEDURE — 36415 COLL VENOUS BLD VENIPUNCTURE: CPT

## 2023-10-10 PROCEDURE — 85520 HEPARIN ASSAY: CPT

## 2023-10-10 PROCEDURE — 2700000000 HC OXYGEN THERAPY PER DAY

## 2023-10-10 PROCEDURE — 6370000000 HC RX 637 (ALT 250 FOR IP)

## 2023-10-10 RX ORDER — LORAZEPAM 1 MG/1
1 TABLET ORAL
Status: DISCONTINUED | OUTPATIENT
Start: 2023-10-10 | End: 2023-10-14

## 2023-10-10 RX ORDER — LORAZEPAM 2 MG/ML
3 INJECTION INTRAMUSCULAR
Status: DISCONTINUED | OUTPATIENT
Start: 2023-10-10 | End: 2023-10-14

## 2023-10-10 RX ORDER — HEPARIN SODIUM 1000 [USP'U]/ML
4000 INJECTION, SOLUTION INTRAVENOUS; SUBCUTANEOUS ONCE
Status: COMPLETED | OUTPATIENT
Start: 2023-10-10 | End: 2023-10-10

## 2023-10-10 RX ORDER — NICOTINE 21 MG/24HR
1 PATCH, TRANSDERMAL 24 HOURS TRANSDERMAL DAILY
Status: DISCONTINUED | OUTPATIENT
Start: 2023-10-10 | End: 2023-10-14 | Stop reason: HOSPADM

## 2023-10-10 RX ORDER — SODIUM CHLORIDE 0.9 % (FLUSH) 0.9 %
5-40 SYRINGE (ML) INJECTION EVERY 12 HOURS SCHEDULED
Status: DISCONTINUED | OUTPATIENT
Start: 2023-10-10 | End: 2023-10-14 | Stop reason: HOSPADM

## 2023-10-10 RX ORDER — LORAZEPAM 2 MG/1
4 TABLET ORAL
Status: DISCONTINUED | OUTPATIENT
Start: 2023-10-10 | End: 2023-10-14

## 2023-10-10 RX ORDER — HEPARIN SODIUM 10000 [USP'U]/100ML
5-30 INJECTION, SOLUTION INTRAVENOUS CONTINUOUS
Status: DISCONTINUED | OUTPATIENT
Start: 2023-10-10 | End: 2023-10-13

## 2023-10-10 RX ORDER — LORAZEPAM 2 MG/ML
1 INJECTION INTRAMUSCULAR
Status: DISCONTINUED | OUTPATIENT
Start: 2023-10-10 | End: 2023-10-14

## 2023-10-10 RX ORDER — LORAZEPAM 2 MG/1
2 TABLET ORAL
Status: DISCONTINUED | OUTPATIENT
Start: 2023-10-10 | End: 2023-10-14

## 2023-10-10 RX ORDER — HEPARIN SODIUM 1000 [USP'U]/ML
4000 INJECTION, SOLUTION INTRAVENOUS; SUBCUTANEOUS PRN
Status: DISCONTINUED | OUTPATIENT
Start: 2023-10-10 | End: 2023-10-14 | Stop reason: HOSPADM

## 2023-10-10 RX ORDER — LORAZEPAM 2 MG/ML
2 INJECTION INTRAMUSCULAR
Status: DISCONTINUED | OUTPATIENT
Start: 2023-10-10 | End: 2023-10-14

## 2023-10-10 RX ORDER — SODIUM CHLORIDE 0.9 % (FLUSH) 0.9 %
5-40 SYRINGE (ML) INJECTION PRN
Status: DISCONTINUED | OUTPATIENT
Start: 2023-10-10 | End: 2023-10-14 | Stop reason: HOSPADM

## 2023-10-10 RX ORDER — LORAZEPAM 2 MG/ML
4 INJECTION INTRAMUSCULAR
Status: DISCONTINUED | OUTPATIENT
Start: 2023-10-10 | End: 2023-10-14

## 2023-10-10 RX ORDER — LANOLIN ALCOHOL/MO/W.PET/CERES
100 CREAM (GRAM) TOPICAL DAILY
Status: DISCONTINUED | OUTPATIENT
Start: 2023-10-10 | End: 2023-10-14 | Stop reason: HOSPADM

## 2023-10-10 RX ORDER — ENOXAPARIN SODIUM 100 MG/ML
30 INJECTION SUBCUTANEOUS 2 TIMES DAILY
Status: DISCONTINUED | OUTPATIENT
Start: 2023-10-10 | End: 2023-10-10

## 2023-10-10 RX ORDER — HEPARIN SODIUM 1000 [USP'U]/ML
2000 INJECTION, SOLUTION INTRAVENOUS; SUBCUTANEOUS PRN
Status: DISCONTINUED | OUTPATIENT
Start: 2023-10-10 | End: 2023-10-14 | Stop reason: HOSPADM

## 2023-10-10 RX ORDER — SODIUM CHLORIDE 9 MG/ML
INJECTION, SOLUTION INTRAVENOUS PRN
Status: DISCONTINUED | OUTPATIENT
Start: 2023-10-10 | End: 2023-10-14 | Stop reason: HOSPADM

## 2023-10-10 RX ORDER — IPRATROPIUM BROMIDE AND ALBUTEROL SULFATE 2.5; .5 MG/3ML; MG/3ML
1 SOLUTION RESPIRATORY (INHALATION) 2 TIMES DAILY
Status: DISCONTINUED | OUTPATIENT
Start: 2023-10-11 | End: 2023-10-11

## 2023-10-10 RX ORDER — ALBUTEROL SULFATE 2.5 MG/3ML
2.5 SOLUTION RESPIRATORY (INHALATION) EVERY 4 HOURS PRN
Status: DISCONTINUED | OUTPATIENT
Start: 2023-10-10 | End: 2023-10-14 | Stop reason: HOSPADM

## 2023-10-10 RX ADMIN — ALBUTEROL SULFATE 2.5 MG: 2.5 SOLUTION RESPIRATORY (INHALATION) at 03:35

## 2023-10-10 RX ADMIN — Medication 100 MG: at 16:11

## 2023-10-10 RX ADMIN — DOXYCYCLINE HYCLATE 100 MG: 100 TABLET, COATED ORAL at 08:19

## 2023-10-10 RX ADMIN — HEPARIN SODIUM 4000 UNITS: 1000 INJECTION INTRAVENOUS; SUBCUTANEOUS at 13:33

## 2023-10-10 RX ADMIN — PROPOFOL 25 MCG/KG/MIN: 10 INJECTION, EMULSION INTRAVENOUS at 00:21

## 2023-10-10 RX ADMIN — PREDNISONE 50 MG: 20 TABLET ORAL at 08:19

## 2023-10-10 RX ADMIN — IPRATROPIUM BROMIDE AND ALBUTEROL SULFATE 1 DOSE: 2.5; .5 SOLUTION RESPIRATORY (INHALATION) at 16:36

## 2023-10-10 RX ADMIN — LORAZEPAM 2 MG: 2 TABLET ORAL at 23:51

## 2023-10-10 RX ADMIN — DOXYCYCLINE HYCLATE 100 MG: 100 TABLET, COATED ORAL at 20:30

## 2023-10-10 RX ADMIN — IPRATROPIUM BROMIDE AND ALBUTEROL SULFATE 1 DOSE: 2.5; .5 SOLUTION RESPIRATORY (INHALATION) at 11:47

## 2023-10-10 RX ADMIN — SODIUM CHLORIDE, PRESERVATIVE FREE 20 MG: 5 INJECTION INTRAVENOUS at 08:19

## 2023-10-10 RX ADMIN — ALBUTEROL SULFATE 2.5 MG: 2.5 SOLUTION RESPIRATORY (INHALATION) at 00:33

## 2023-10-10 RX ADMIN — HEPARIN SODIUM 4000 UNITS: 1000 INJECTION INTRAVENOUS; SUBCUTANEOUS at 23:32

## 2023-10-10 RX ADMIN — ENOXAPARIN SODIUM 30 MG: 100 INJECTION SUBCUTANEOUS at 08:19

## 2023-10-10 RX ADMIN — PROPOFOL 25 MCG/KG/MIN: 10 INJECTION, EMULSION INTRAVENOUS at 05:52

## 2023-10-10 RX ADMIN — HEPARIN SODIUM 8 UNITS/KG/HR: 10000 INJECTION, SOLUTION INTRAVENOUS at 13:40

## 2023-10-10 RX ADMIN — SODIUM CHLORIDE, PRESERVATIVE FREE 10 ML: 5 INJECTION INTRAVENOUS at 20:35

## 2023-10-10 RX ADMIN — SODIUM CHLORIDE, PRESERVATIVE FREE 20 MG: 5 INJECTION INTRAVENOUS at 20:33

## 2023-10-10 RX ADMIN — IPRATROPIUM BROMIDE AND ALBUTEROL SULFATE 1 DOSE: 2.5; .5 SOLUTION RESPIRATORY (INHALATION) at 21:33

## 2023-10-10 RX ADMIN — IPRATROPIUM BROMIDE AND ALBUTEROL SULFATE 1 DOSE: 2.5; .5 SOLUTION RESPIRATORY (INHALATION) at 08:27

## 2023-10-10 ASSESSMENT — PAIN SCALES - GENERAL: PAINLEVEL_OUTOF10: 0

## 2023-10-10 ASSESSMENT — PULMONARY FUNCTION TESTS
PIF_VALUE: 31
PIF_VALUE: 24
PIF_VALUE: 32
PIF_VALUE: 16
PIF_VALUE: 31

## 2023-10-10 NOTE — PLAN OF CARE
Problem: Discharge Planning  Goal: Discharge to home or other facility with appropriate resources  10/10/2023 0015 by Elmira Pineda RN  Outcome: Progressing  10/9/2023 1549 by Silviano Bates RN  Outcome: Progressing     Problem: Safety - Medical Restraint  Goal: Remains free of injury from restraints (Restraint for Interference with Medical Device)  Description: INTERVENTIONS:  1. Determine that other, less restrictive measures have been tried or would not be effective before applying the restraint  2. Evaluate the patient's condition at the time of restraint application  3. Inform patient/family regarding the reason for restraint  4.  Q2H: Monitor safety, psychosocial status, comfort, nutrition and hydration  Outcome: Progressing  Flowsheets  Taken 10/10/2023 0000 by Elmira Pineda RN  Remains free of injury from restraints (restraint for interference with medical device): Every 2 hours: Monitor safety, psychosocial status, comfort, nutrition and hydration  Taken 10/9/2023 2200 by Elmira Pineda RN  Remains free of injury from restraints (restraint for interference with medical device): Every 2 hours: Monitor safety, psychosocial status, comfort, nutrition and hydration  Taken 10/9/2023 2000 by Elmira Pineda RN  Remains free of injury from restraints (restraint for interference with medical device): Every 2 hours: Monitor safety, psychosocial status, comfort, nutrition and hydration  Taken 10/9/2023 1600 by Silviano Bates RN  Remains free of injury from restraints (restraint for interference with medical device): Every 2 hours: Monitor safety, psychosocial status, comfort, nutrition and hydration  Taken 10/9/2023 1400 by Silviano Bates RN  Remains free of injury from restraints (restraint for interference with medical device): Every 2 hours: Monitor safety, psychosocial status, comfort, nutrition and hydration  Taken 10/9/2023 1200 by Silviano Bates RN  Remains free of injury from restraints (restraint

## 2023-10-10 NOTE — PROGRESS NOTES
INTENSIVE CARE UNIT  Resident Physician Progress Note    Patient - Aliyah Ochoa  Date of Admission -  10/9/2023  6:00 AM  Date of Evaluation -  10/10/2023  Room and Bed Number -  8342/0526-85   Hospital Day - 1    SUBJECTIVE:     HISTORY OF PRESENT ILLNESS:       Aliyah Ochoa is a 58 y.o. past medical history of COPD, otherwise unknown medical history per documentation received, presented to [facility] due to concern for shortness of breath and fatigue. There is also concern for associated weight loss of 30 pounds, although no time period has been specified. Vitals at outside hospital T 98.7 heart rate 114, respiratory rate 20, /106, SPO2 68% on room air. Patient was given multiple breathing treatments, Solu-Medrol 125 mg IV, azithromycin, Rocephin, budesonide inhaler and subsequently intubated due to respiratory compromise. Labs  WBC 5.6, Hgb 18.6, , D-dimer 2.49, INR 1.14, ABG pH 7.193, PCO2 88.4, PO2 one 0.1, HCO3 33.9   (1725), K4.8, CL 84, CO2 33.2, AG 8.6, BUN 33, CR 1.25, lactate 3.0, proBNP 11,425, troponin 149.8 COVID test negative. Urinalysis negative. Chest CT negative for PE.  ECG demonstrating sinus tachycardia extreme axis deviation, T wave inversions in V2, V3, V4. No acute ischemic findings     This patient was initially admitted to the ICU at the outlying facility, however due to worsening blood gases was transferred to [facility]. OVERNIGHT EVENTS:      No acute events over night.     T 97.8, HR 70, RR 18, 132/95, SPO2 95     Neuro:  Sedated with Fentanyl, propofol     Resp:  Currently intubated  ABG : pH 7.429, PCO2 59.2, PO2 83.7, HCO3 39.2  Vent settings RR 18, , PEEP 5, FIO2 35%  Plan to wean sedation with CPAP trial today   DuoNebs Q4H  Doxycycline 100 BID  Prednisone 50 mg QD       CV:   BNP 11,425 --> 948; troponin 149.8 --> 29 --> 34 --> 41 -->34  Consult cardiology  Lasix 20 IV BID     GI/Nutrition:  NPO  NGT in  Pepcid for prophylaxis Rate 79 BPM    P-R Interval 134 ms    QRS Duration 90 ms    Q-T Interval 394 ms    QTc Calculation (Bazett) 451 ms    P Axis 23 degrees    R Axis 3 degrees    T Axis 38 degrees       Radiology/Imaging:  XR CHEST PORTABLE   Final Result   Diffuse mild interstitial prominence.              ASSESSMENT:     Patient Active Problem List    Diagnosis Date Noted    Acute exacerbation of chronic obstructive pulmonary disease (COPD) ([address]) 10/09/2023        PLAN:      WEAN PER PROTOCOL:  []   No  []   Yes []   N/A                         ICU PROPHYLAXIS:                Stress ulcer:  []   PPI Agent []   Y6Ljxls []   Sucralfate []   Other []   None      VTE:  []   Enoxaparin []   SQ Heparin []   EPC Cuffs []  Other                       NUTRITION:   []  NPO []   TF:   []   TPN []   PO                       HOME MEDS RECONCILED:  []   No  []   Yes                           CONSULTATION NEEDED:  []   No  []   Yes                           FAMILY UPDATED:  []   No  []   Yes                           TRANSFER OUT OF ICU:  []   No  []   Yes             PLAN/MEDICAL DECISION MAKING:    CODE STATUS: Full Code      DISPOSITION:  [x] To remain ICU:   [] OK for out of ICU from Critical Care standpoint

## 2023-10-10 NOTE — PLAN OF CARE
Problem: Discharge Planning  Goal: Discharge to home or other facility with appropriate resources  Outcome: Progressing     Problem: Safety - Adult  Goal: Free from fall injury  Outcome: Progressing     Problem: Pain  Goal: Verbalizes/displays adequate comfort level or baseline comfort level  Outcome: Progressing     Problem: Skin/Tissue Integrity  Goal: Absence of new skin breakdown  Description: 1. Monitor for areas of redness and/or skin breakdown  2. Assess vascular access sites hourly  3. Every 4-6 hours minimum:  Change oxygen saturation probe site  4. Every 4-6 hours:  If on nasal continuous positive airway pressure, respiratory therapy assess nares and determine need for appliance change or resting period.   Outcome: Progressing     Problem: Respiratory - Adult  Goal: Achieves optimal ventilation and oxygenation  10/10/2023 1647 by Amy Gonzalez RN  Outcome: Progressing  10/10/2023 0840 by Krissy Harrison RCP  Outcome: Progressing

## 2023-10-10 NOTE — CARE COORDINATION
Consult received for consideration of rehab.  SW will f/u with pt once he is extubated/alert/oriented

## 2023-10-10 NOTE — PROGRESS NOTES
Pharmacist Review and Automatic Dose Adjustment of Prophylactic Enoxaparin         The reviewing pharmacist has made an adjustment to the ordered enoxaparin dose or converted to UFH per the approved [facility] protocol and table as identified below. Grant Raymond is a 58 y.o. male. Recent Labs     10/09/23  1153 10/09/23  1907   CREATININE 0.4* 0.5*       Estimated Creatinine Clearance: 199 mL/min (A) (based on SCr of 0.5 mg/dL (L)).     Recent Labs     10/09/23  0858 10/10/23  0527   HGB 19.1* 17.9*   HCT 59.1* 57.0*   PLT See Reflexed IPF Result 145     Recent Labs     10/09/23  0858   INR 1.2       Height:   Ht Readings from Last 1 Encounters:   10/09/23 6' (1.829 m)     Weight:  Wt Readings from Last 1 Encounters:   10/09/23 250 lb 9.6 oz (113.7 kg)               Plan: Based upon the patient's weight and renal function    Ordered: Enoxaparin 40mg SUBQ Daily    Changed/converted to    New Order: Enoxaparin 30mg SUBQ BID      Thank you,  Alexei Hobbs, [facility]  10/10/2023, 7:57 AM

## 2023-10-10 NOTE — CONSULTS
Nutrition Note    Consulted for TF Ordering & Mgt. Pt extubated this morning, diet advanced to Regular. TF not appropriate at this time. Will assess at LOS.      Electronically signed by Norberto Lowery MS, RD, LD on 10/10/23 at 2:12 PM EDT    Contact:   Floor Mobile: [phone]  Desk Phone: [phone]  RD Weekend Mobile: [phone]

## 2023-10-10 NOTE — CONSULTS
medical history on file. Past Surgical History:   has no past surgical history on file.      Home Medications:    Prior to Admission medications    Not on File      Current Facility-Administered Medications: heparin (porcine) injection 4,000 Units, 4,000 Units, IntraVENous, PRN  heparin (porcine) injection 2,000 Units, 2,000 Units, IntraVENous, PRN  heparin 25,000 units in dextrose 5% 250 mL (premix) infusion, 5-30 Units/kg/hr, IntraVENous, Continuous  nicotine (NICODERM CQ) 21 MG/24HR 1 patch, 1 patch, TransDERmal, Daily  sodium chloride flush 0.9 % injection 5-40 mL, 5-40 mL, IntraVENous, 2 times per day  sodium chloride flush 0.9 % injection 5-40 mL, 5-40 mL, IntraVENous, PRN  0.9 % sodium chloride infusion, , IntraVENous, PRN  thiamine tablet 100 mg, 100 mg, Oral, Daily  LORazepam (ATIVAN) tablet 1 mg, 1 mg, Oral, Q1H PRN **OR** LORazepam (ATIVAN) injection 1 mg, 1 mg, IntraVENous, Q1H PRN **OR** LORazepam (ATIVAN) tablet 2 mg, 2 mg, Oral, Q1H PRN **OR** LORazepam (ATIVAN) injection 2 mg, 2 mg, IntraVENous, Q1H PRN **OR** LORazepam (ATIVAN) tablet 3 mg, 3 mg, Oral, Q1H PRN **OR** LORazepam (ATIVAN) injection 3 mg, 3 mg, IntraVENous, Q1H PRN **OR** LORazepam (ATIVAN) tablet 4 mg, 4 mg, Oral, Q1H PRN **OR** LORazepam (ATIVAN) injection 4 mg, 4 mg, IntraVENous, Q1H PRN  sodium chloride flush 0.9 % injection 5-40 mL, 5-40 mL, IntraVENous, 2 times per day  sodium chloride flush 0.9 % injection 5-40 mL, 5-40 mL, IntraVENous, PRN  0.9 % sodium chloride infusion, , IntraVENous, PRN  ondansetron (ZOFRAN-ODT) disintegrating tablet 4 mg, 4 mg, Oral, Q8H PRN **OR** ondansetron (ZOFRAN) injection 4 mg, 4 mg, IntraVENous, Q6H PRN  polyethylene glycol (GLYCOLAX) packet 17 g, 17 g, Oral, Daily PRN  acetaminophen (TYLENOL) tablet 650 mg, 650 mg, Oral, Q6H PRN **OR** acetaminophen (TYLENOL) suppository 650 mg, 650 mg, Rectal, Q6H PRN  fentaNYL 50 mcg/mL 50 mL infusion,  mcg/hr, IntraVENous, Continuous  propofol infusion, BUN 17  --  15   CREATININE 0.5*  --  0.6*   LABGLOM >60  --  >60   GLUCOSE 115*  --  85     BNP: No results for input(s): \"BNP\" in the last 72 hours. PT/INR:   Recent Labs     10/09/23  0858 10/10/23  1223   PROTIME 14.5 13.8   INR 1.2 1.1     APTT:  Recent Labs     10/10/23  1223   APTT 26.5       LIVER PROFILE:  Recent Labs     10/09/23  1153   AST 45*   ALT 44*   LABALBU 3.2*       IMPRESSION:    Patient Active Problem List   Diagnosis    Acute exacerbation of chronic obstructive pulmonary disease (COPD) (HCC)     Acute hypoxic respiratory failure secondary to COPD exacerbation, requiring mechanical ventilation   COPD exacerbation   Elevated troponin - type I vs type II  Abnormal EKG     RECOMMENDATIONS:  Start I.v. heparin as per ACS protocol due to elevate troponin and abnormal EKG. Trend troponin x 2. Obtain 2D ECHO to eval EF/WMAs and for structural/ valvular heart disease. Further recommendations to follow based on the above and once acute issues resolve. Replace electrolytes to keep K>4 and Mg>2. WE will continue to follow.        Abhishek Bertrand MD  Fellow, [id number]

## 2023-10-11 LAB
ANION GAP SERPL CALCULATED.3IONS-SCNC: 4 MMOL/L (ref 9–17)
ANTI-XA UNFRAC HEPARIN: 0.11 IU/L
ANTI-XA UNFRAC HEPARIN: 0.59 IU/L
ANTI-XA UNFRAC HEPARIN: <0.1 IU/L
BASOPHILS # BLD: <0.03 K/UL (ref 0–0.2)
BASOPHILS NFR BLD: 0 % (ref 0–2)
BUN SERPL-MCNC: 12 MG/DL (ref 8–23)
CALCIUM SERPL-MCNC: 8.5 MG/DL (ref 8.6–10.4)
CHLORIDE SERPL-SCNC: 95 MMOL/L (ref 98–107)
CO2 SERPL-SCNC: 38 MMOL/L (ref 20–31)
CREAT SERPL-MCNC: 0.6 MG/DL (ref 0.7–1.2)
EOSINOPHIL # BLD: 0.04 K/UL (ref 0–0.44)
EOSINOPHILS RELATIVE PERCENT: 1 % (ref 1–4)
ERYTHROCYTE [DISTWIDTH] IN BLOOD BY AUTOMATED COUNT: 13.8 % (ref 11.8–14.4)
GFR SERPL CREATININE-BSD FRML MDRD: >60 ML/MIN/1.73M2
GLUCOSE SERPL-MCNC: 82 MG/DL (ref 70–99)
HCT VFR BLD AUTO: 54.1 % (ref 40.7–50.3)
HGB BLD-MCNC: 16.6 G/DL (ref 13–17)
IMM GRANULOCYTES # BLD AUTO: <0.03 K/UL (ref 0–0.3)
IMM GRANULOCYTES NFR BLD: 0 %
LYMPHOCYTES NFR BLD: 1.01 K/UL (ref 1.1–3.7)
LYMPHOCYTES RELATIVE PERCENT: 21 % (ref 24–43)
MCH RBC QN AUTO: 32 PG (ref 25.2–33.5)
MCHC RBC AUTO-ENTMCNC: 30.7 G/DL (ref 28.4–34.8)
MCV RBC AUTO: 104.2 FL (ref 82.6–102.9)
MICROORGANISM SPEC CULT: NORMAL
MICROORGANISM/AGENT SPEC: NORMAL
MONOCYTES NFR BLD: 0.69 K/UL (ref 0.1–1.2)
MONOCYTES NFR BLD: 14 % (ref 3–12)
NEUTROPHILS NFR BLD: 64 % (ref 36–65)
NEUTS SEG NFR BLD: 3.07 K/UL (ref 1.5–8.1)
NRBC BLD-RTO: 0 PER 100 WBC
PLATELET # BLD AUTO: 154 K/UL (ref 138–453)
PMV BLD AUTO: 9.2 FL (ref 8.1–13.5)
POTASSIUM SERPL-SCNC: 4.2 MMOL/L (ref 3.7–5.3)
RBC # BLD AUTO: 5.19 M/UL (ref 4.21–5.77)
RBC # BLD: ABNORMAL 10*6/UL
SODIUM SERPL-SCNC: 137 MMOL/L (ref 135–144)
SPECIMEN DESCRIPTION: NORMAL
TROPONIN I SERPL HS-MCNC: 36 NG/L (ref 0–22)
TROPONIN I SERPL HS-MCNC: 41 NG/L (ref 0–22)
TROPONIN I SERPL HS-MCNC: 66 NG/L (ref 0–22)
WBC OTHER # BLD: 4.8 K/UL (ref 3.5–11.3)

## 2023-10-11 PROCEDURE — 2060000000 HC ICU INTERMEDIATE R&B

## 2023-10-11 PROCEDURE — 6370000000 HC RX 637 (ALT 250 FOR IP): Performed by: STUDENT IN AN ORGANIZED HEALTH CARE EDUCATION/TRAINING PROGRAM

## 2023-10-11 PROCEDURE — 99291 CRITICAL CARE FIRST HOUR: CPT | Performed by: INTERNAL MEDICINE

## 2023-10-11 PROCEDURE — 6370000000 HC RX 637 (ALT 250 FOR IP)

## 2023-10-11 PROCEDURE — 85025 COMPLETE CBC W/AUTO DIFF WBC: CPT

## 2023-10-11 PROCEDURE — 6360000002 HC RX W HCPCS: Performed by: INTERNAL MEDICINE

## 2023-10-11 PROCEDURE — 5A09457 ASSISTANCE WITH RESPIRATORY VENTILATION, 24-96 CONSECUTIVE HOURS, CONTINUOUS POSITIVE AIRWAY PRESSURE: ICD-10-PCS | Performed by: STUDENT IN AN ORGANIZED HEALTH CARE EDUCATION/TRAINING PROGRAM

## 2023-10-11 PROCEDURE — 2700000000 HC OXYGEN THERAPY PER DAY

## 2023-10-11 PROCEDURE — 2580000003 HC RX 258: Performed by: STUDENT IN AN ORGANIZED HEALTH CARE EDUCATION/TRAINING PROGRAM

## 2023-10-11 PROCEDURE — 51798 US URINE CAPACITY MEASURE: CPT

## 2023-10-11 PROCEDURE — 80048 BASIC METABOLIC PNL TOTAL CA: CPT

## 2023-10-11 PROCEDURE — 84484 ASSAY OF TROPONIN QUANT: CPT

## 2023-10-11 PROCEDURE — 36415 COLL VENOUS BLD VENIPUNCTURE: CPT

## 2023-10-11 PROCEDURE — 94640 AIRWAY INHALATION TREATMENT: CPT

## 2023-10-11 PROCEDURE — 85520 HEPARIN ASSAY: CPT

## 2023-10-11 PROCEDURE — 94761 N-INVAS EAR/PLS OXIMETRY MLT: CPT

## 2023-10-11 RX ORDER — BUDESONIDE AND FORMOTEROL FUMARATE DIHYDRATE 160; 4.5 UG/1; UG/1
2 AEROSOL RESPIRATORY (INHALATION)
Status: DISCONTINUED | OUTPATIENT
Start: 2023-10-11 | End: 2023-10-14 | Stop reason: HOSPADM

## 2023-10-11 RX ORDER — AMLODIPINE BESYLATE 10 MG/1
10 TABLET ORAL DAILY
Status: DISCONTINUED | OUTPATIENT
Start: 2023-10-11 | End: 2023-10-14 | Stop reason: HOSPADM

## 2023-10-11 RX ORDER — FAMOTIDINE 20 MG/1
20 TABLET, FILM COATED ORAL 2 TIMES DAILY
Status: DISCONTINUED | OUTPATIENT
Start: 2023-10-11 | End: 2023-10-14 | Stop reason: HOSPADM

## 2023-10-11 RX ORDER — FOLIC ACID 1 MG/1
1 TABLET ORAL DAILY
Status: DISCONTINUED | OUTPATIENT
Start: 2023-10-11 | End: 2023-10-14 | Stop reason: HOSPADM

## 2023-10-11 RX ORDER — M-VIT,TX,IRON,MINS/CALC/FOLIC 27MG-0.4MG
1 TABLET ORAL DAILY
Status: DISCONTINUED | OUTPATIENT
Start: 2023-10-11 | End: 2023-10-14 | Stop reason: HOSPADM

## 2023-10-11 RX ORDER — IPRATROPIUM BROMIDE AND ALBUTEROL SULFATE 2.5; .5 MG/3ML; MG/3ML
1 SOLUTION RESPIRATORY (INHALATION)
Status: DISCONTINUED | OUTPATIENT
Start: 2023-10-11 | End: 2023-10-14 | Stop reason: HOSPADM

## 2023-10-11 RX ADMIN — HEPARIN SODIUM 2000 UNITS: 1000 INJECTION INTRAVENOUS; SUBCUTANEOUS at 06:24

## 2023-10-11 RX ADMIN — IPRATROPIUM BROMIDE AND ALBUTEROL SULFATE 1 DOSE: 2.5; .5 SOLUTION RESPIRATORY (INHALATION) at 21:41

## 2023-10-11 RX ADMIN — FOLIC ACID 1 MG: 1 TABLET ORAL at 08:42

## 2023-10-11 RX ADMIN — IPRATROPIUM BROMIDE AND ALBUTEROL SULFATE 1 DOSE: 2.5; .5 SOLUTION RESPIRATORY (INHALATION) at 08:57

## 2023-10-11 RX ADMIN — DOXYCYCLINE HYCLATE 100 MG: 100 TABLET, COATED ORAL at 08:42

## 2023-10-11 RX ADMIN — FAMOTIDINE 20 MG: 20 TABLET, FILM COATED ORAL at 21:26

## 2023-10-11 RX ADMIN — FAMOTIDINE 20 MG: 20 TABLET, FILM COATED ORAL at 08:45

## 2023-10-11 RX ADMIN — DOXYCYCLINE HYCLATE 100 MG: 100 TABLET, COATED ORAL at 21:25

## 2023-10-11 RX ADMIN — Medication 100 MG: at 08:42

## 2023-10-11 RX ADMIN — AMLODIPINE BESYLATE 10 MG: 10 TABLET ORAL at 18:48

## 2023-10-11 RX ADMIN — PREDNISONE 50 MG: 20 TABLET ORAL at 08:42

## 2023-10-11 RX ADMIN — SODIUM CHLORIDE, PRESERVATIVE FREE 10 ML: 5 INJECTION INTRAVENOUS at 08:43

## 2023-10-11 RX ADMIN — HEPARIN SODIUM 18 UNITS/KG/HR: 10000 INJECTION, SOLUTION INTRAVENOUS at 23:01

## 2023-10-11 RX ADMIN — BUDESONIDE AND FORMOTEROL FUMARATE DIHYDRATE 2 PUFF: 160; 4.5 AEROSOL RESPIRATORY (INHALATION) at 21:41

## 2023-10-11 RX ADMIN — Medication 1 TABLET: at 08:42

## 2023-10-11 RX ADMIN — HEPARIN SODIUM 14 UNITS/KG/HR: 10000 INJECTION, SOLUTION INTRAVENOUS at 09:19

## 2023-10-11 RX ADMIN — HEPARIN SODIUM 4000 UNITS: 1000 INJECTION INTRAVENOUS; SUBCUTANEOUS at 16:28

## 2023-10-11 ASSESSMENT — PAIN SCALES - GENERAL
PAINLEVEL_OUTOF10: 0
PAINLEVEL_OUTOF10: 0

## 2023-10-11 NOTE — PLAN OF CARE
Problem: Discharge Planning  Goal: Discharge to home or other facility with appropriate resources  10/10/2023 2050 by Poncho Guerrero RN  Outcome: Progressing  10/10/2023 1647 by Flako Vazquez RN  Outcome: Progressing     Problem: Safety - Adult  Goal: Free from fall injury  10/10/2023 2050 by Poncho Guerrero RN  Outcome: Progressing  10/10/2023 1647 by Flako Vazquez RN  Outcome: Progressing     Problem: Pain  Goal: Verbalizes/displays adequate comfort level or baseline comfort level  10/10/2023 2050 by Poncho Guerrero RN  Outcome: Progressing  10/10/2023 1647 by Flako Vazquez RN  Outcome: Progressing     Problem: Skin/Tissue Integrity  Goal: Absence of new skin breakdown  Description: 1. Monitor for areas of redness and/or skin breakdown  2. Assess vascular access sites hourly  3. Every 4-6 hours minimum:  Change oxygen saturation probe site  4. Every 4-6 hours:  If on nasal continuous positive airway pressure, respiratory therapy assess nares and determine need for appliance change or resting period.   10/10/2023 2050 by Poncho Guerrero RN  Outcome: Progressing  10/10/2023 1647 by Flako Vazquez RN  Outcome: Progressing     Problem: Respiratory - Adult  Goal: Achieves optimal ventilation and oxygenation  10/10/2023 2050 by Poncho Guerrero RN  Outcome: Progressing  10/10/2023 1647 by Flako Vazquez RN  Outcome: Progressing  10/10/2023 0840 by Matt Luz RCP  Outcome: Progressing

## 2023-10-11 NOTE — PROGRESS NOTES
12 %    Eosinophils % 1 1 - 4 %    Basophils % 0 0 - 2 %    Immature Granulocytes 0 0 %    Neutrophils Absolute 3.07 1.50 - 8.10 k/uL    Lymphocytes Absolute 1.01 (L) 1.10 - 3.70 k/uL    Monocytes Absolute 0.69 0.10 - 1.20 k/uL    Eosinophils Absolute 0.04 0.00 - 0.44 k/uL    Basophils Absolute <0.03 0.00 - 0.20 k/uL    Absolute Immature Granulocyte <0.03 0.00 - 0.30 k/uL    RBC Morphology MACROCYTOSIS PRESENT    Anti-Xa, Unfractionated Heparin   Result Value Ref Range    Anti-XA Unfrac Heparin <0.10 IU/L   Anti-Xa, Unfractionated Heparin   Result Value Ref Range    Anti-XA Unfrac Heparin 0.11 IU/L   Arterial Blood Gas, POC   Result Value Ref Range    POC pH 7.301 (L) 7.350 - 7.450    POC pCO2 67.9 (H) 35.0 - 48.0 mm Hg    POC PO2 498.0 (H) 83.0 - 108.0 mm Hg    POC HCO3 33.5 (H) 21.0 - 28.0 mmol/L    Positive Base Excess, Art 3.4 (H) 0.0 - 3.0 mmol/L    POC O2 .0 (H) 94.0 - 98.0 %    O2 Delivery Device Adult Ventilator     Sample Site Arterial Line     FIO2 100.0    Arterial Blood Gas, POC   Result Value Ref Range    POC pH 7.421 7.350 - 7.450    POC pCO2 55.7 (H) 35.0 - 48.0 mm Hg    POC PO2 82.2 (L) 83.0 - 108.0 mm Hg    POC HCO3 36.2 (H) 21.0 - 28.0 mmol/L    Positive Base Excess, Art 8.6 (H) 0.0 - 3.0 mmol/L    POC O2 SAT 95.9 94.0 - 98.0 %    O2 Delivery Device Adult Ventilator     Jerod Test POSITIVE     Sample Site Right Radial Artery     Mode PRVC     FIO2 40.0    Arterial Blood Gas, POC   Result Value Ref Range    POC pH 7.429 7.350 - 7.450    POC pCO2 59.2 (H) 35.0 - 48.0 mm Hg    POC PO2 83.7 83.0 - 108.0 mm Hg    POC HCO3 39.2 (H) 21.0 - 28.0 mmol/L    Positive Base Excess, Art 10.9 (H) 0.0 - 3.0 mmol/L    POC O2 SAT 96.1 94.0 - 98.0 %    Jerod Test POSITIVE     FIO2 40.0    POCT Glucose   Result Value Ref Range    POC Glucose 99 74 - 100 mg/dL   POC Glucose Fingerstick   Result Value Ref Range    POC Glucose 142 (H) 75 - 110 mg/dL   EKG 12 lead   Result Value Ref Range    Ventricular Rate 73 BPM Atrial Rate 73 BPM    P-R Interval 148 ms    QRS Duration 106 ms    Q-T Interval 418 ms    QTc Calculation (Bazett) 460 ms    P Axis 37 degrees    R Axis 48 degrees    T Axis 63 degrees   EKG 12 Lead   Result Value Ref Range    Ventricular Rate 77 BPM    Atrial Rate 77 BPM    P-R Interval 132 ms    QRS Duration 90 ms    Q-T Interval 398 ms    QTc Calculation (Bazett) 450 ms    P Axis 30 degrees    R Axis 10 degrees    T Axis 47 degrees   EKG 12 Lead   Result Value Ref Range    Ventricular Rate 79 BPM    Atrial Rate 79 BPM    P-R Interval 134 ms    QRS Duration 90 ms    Q-T Interval 394 ms    QTc Calculation (Bazett) 451 ms    P Axis 23 degrees    R Axis 3 degrees    T Axis 38 degrees       Radiology/Imaging:  XR CHEST PORTABLE   Final Result   Diffuse mild interstitial prominence.              ASSESSMENT:     Patient Active Problem List    Diagnosis Date Noted    NSTEMI (non-ST elevated myocardial infarction) ([address]) 10/10/2023    Acute exacerbation of chronic obstructive pulmonary disease (COPD) ([address]) 10/09/2023        PLAN:      WEAN PER PROTOCOL:  []   No  []   Yes []   N/A                         ICU PROPHYLAXIS:                Stress ulcer:  []   PPI Agent []   [id number] []   Sucralfate []   Other []   None      VTE:  []   Enoxaparin []   SQ Heparin []   EPC Cuffs []  Other                       NUTRITION:   []  NPO []   TF:   []   TPN []   PO                       HOME MEDS RECONCILED:  []   No  []   Yes                           CONSULTATION NEEDED:  []   No  []   Yes                           FAMILY UPDATED:  []   No  []   Yes                           TRANSFER OUT OF ICU:  []   No  []   Yes             PLAN/MEDICAL DECISION MAKING:    CODE STATUS: Full Code      DISPOSITION:  [x] To remain ICU:   [] OK for out of ICU from Critical Care standpoint

## 2023-10-11 NOTE — PLAN OF CARE
Problem: Discharge Planning  Goal: Discharge to home or other facility with appropriate resources  Outcome: Progressing  Flowsheets (Taken 10/11/2023 1306)  Discharge to home or other facility with appropriate resources: Identify barriers to discharge with patient and caregiver     Problem: Safety - Adult  Goal: Free from fall injury  Outcome: Progressing

## 2023-10-11 NOTE — CARE COORDINATION
Consult for consideration of rehab. Met with pt this a.m was alert and oriented. Pt states he drinks close to a 12pk of beer  almost daily. Denies any drug use. Past rehab in [place] a very long time ago. Attended AA meetings at that time also. No recent intervention. Rehab discussed . Pt states he plans to go back to AA after d/c. Alcohol treatment resources provided , and list of AA meetings with 25 miles radius of his home.

## 2023-10-12 ENCOUNTER — APPOINTMENT (OUTPATIENT)
Age: 63
End: 2023-10-12
Attending: INTERNAL MEDICINE
Payer: COMMERCIAL

## 2023-10-12 PROBLEM — J44.9 COPD, SEVERE (HCC): Status: ACTIVE | Noted: 2023-10-12

## 2023-10-12 PROBLEM — J96.22 ACUTE ON CHRONIC RESPIRATORY FAILURE WITH HYPOXIA AND HYPERCAPNIA (HCC): Status: ACTIVE | Noted: 2023-10-12

## 2023-10-12 PROBLEM — J96.21 ACUTE ON CHRONIC RESPIRATORY FAILURE WITH HYPOXIA AND HYPERCAPNIA (HCC): Status: ACTIVE | Noted: 2023-10-12

## 2023-10-12 LAB
ANION GAP SERPL CALCULATED.3IONS-SCNC: 10 MMOL/L (ref 9–16)
ANTI-XA UNFRAC HEPARIN: 0.28 IU/L
ANTI-XA UNFRAC HEPARIN: 0.62 IU/L
ANTI-XA UNFRAC HEPARIN: 0.7 IU/L
ANTI-XA UNFRAC HEPARIN: 0.86 IU/L
BASOPHILS # BLD: <0.03 K/UL (ref 0–0.2)
BASOPHILS NFR BLD: 0 % (ref 0–2)
BODY TEMPERATURE: 37
BUN SERPL-MCNC: 11 MG/DL (ref 8–23)
CALCIUM SERPL-MCNC: 9.4 MG/DL (ref 8.6–10.4)
CHLORIDE SERPL-SCNC: 90 MMOL/L (ref 98–107)
CO2 SERPL-SCNC: 34 MMOL/L (ref 20–31)
COHGB MFR BLD: 1.4 % (ref 0–5)
CREAT SERPL-MCNC: 0.6 MG/DL (ref 0.7–1.2)
ECHO AO ROOT DIAM: 3.8 CM
ECHO AO ROOT INDEX: 1.63 CM/M2
ECHO AV AREA PEAK VELOCITY: 2.8 CM2
ECHO AV AREA VTI: 3.2 CM2
ECHO AV AREA/BSA PEAK VELOCITY: 1.2 CM2/M2
ECHO AV AREA/BSA VTI: 1.4 CM2/M2
ECHO AV MEAN GRADIENT: 4 MMHG
ECHO AV MEAN VELOCITY: 0.9 M/S
ECHO AV PEAK GRADIENT: 8 MMHG
ECHO AV PEAK VELOCITY: 1.4 M/S
ECHO AV VELOCITY RATIO: 0.71
ECHO AV VTI: 21.6 CM
ECHO BSA: 2.4 M2
ECHO IVC PROX: 2 CM
ECHO LA AREA 2C: 17.6 CM2
ECHO LA AREA 4C: 19.7 CM2
ECHO LA DIAMETER INDEX: 1.59 CM/M2
ECHO LA DIAMETER: 3.7 CM
ECHO LA MAJOR AXIS: 6.1 CM
ECHO LA MINOR AXIS: 5.7 CM
ECHO LA TO AORTIC ROOT RATIO: 0.97
ECHO LA VOL 2C: 45 ML (ref 18–58)
ECHO LA VOL 4C: 52 ML (ref 18–58)
ECHO LA VOL BP: 50 ML (ref 18–58)
ECHO LA VOL/BSA BIPLANE: 21 ML/M2 (ref 16–34)
ECHO LA VOLUME INDEX A2C: 19 ML/M2 (ref 16–34)
ECHO LA VOLUME INDEX A4C: 22 ML/M2 (ref 16–34)
ECHO LV E' LATERAL VELOCITY: 9 CM/S
ECHO LV E' SEPTAL VELOCITY: 8 CM/S
ECHO LV EDV A4C: 49 ML
ECHO LV EDV INDEX A4C: 21 ML/M2
ECHO LV EJECTION FRACTION A4C: 62 %
ECHO LV ESV A4C: 19 ML
ECHO LV ESV INDEX A4C: 8 ML/M2
ECHO LV FRACTIONAL SHORTENING: 52 % (ref 28–44)
ECHO LV INTERNAL DIMENSION DIASTOLE INDEX: 1.97 CM/M2
ECHO LV INTERNAL DIMENSION DIASTOLIC: 4.6 CM (ref 4.2–5.9)
ECHO LV INTERNAL DIMENSION SYSTOLIC INDEX: 0.94 CM/M2
ECHO LV INTERNAL DIMENSION SYSTOLIC: 2.2 CM
ECHO LV IVSD: 1 CM (ref 0.6–1)
ECHO LV MASS 2D: 158.8 G (ref 88–224)
ECHO LV MASS INDEX 2D: 68.2 G/M2 (ref 49–115)
ECHO LV POSTERIOR WALL DIASTOLIC: 1 CM (ref 0.6–1)
ECHO LV RELATIVE WALL THICKNESS RATIO: 0.43
ECHO LVOT AREA: 3.8 CM2
ECHO LVOT AV VTI INDEX: 0.83
ECHO LVOT DIAM: 2.2 CM
ECHO LVOT MEAN GRADIENT: 2 MMHG
ECHO LVOT PEAK GRADIENT: 4 MMHG
ECHO LVOT PEAK VELOCITY: 1 M/S
ECHO LVOT STROKE VOLUME INDEX: 29.4 ML/M2
ECHO LVOT SV: 68.4 ML
ECHO LVOT VTI: 18 CM
ECHO MV A VELOCITY: 1.1 M/S
ECHO MV AREA VTI: 3.5 CM2
ECHO MV E DECELERATION TIME (DT): 239 MS
ECHO MV E VELOCITY: 0.64 M/S
ECHO MV E/A RATIO: 0.58
ECHO MV E/E' LATERAL: 7.11
ECHO MV E/E' RATIO (AVERAGED): 7.56
ECHO MV E/E' SEPTAL: 8
ECHO MV LVOT VTI INDEX: 1.08
ECHO MV MAX VELOCITY: 1.1 M/S
ECHO MV MEAN GRADIENT: 2 MMHG
ECHO MV MEAN VELOCITY: 0.6 M/S
ECHO MV PEAK GRADIENT: 5 MMHG
ECHO MV VTI: 19.4 CM
ECHO PVEIN A VELOCITY: 0.4 M/S
ECHO RV FREE WALL PEAK S': 17 CM/S
ECHO RV INTERNAL DIMENSION: 5.2 CM
ECHO RV TAPSE: 1.9 CM (ref 1.7–?)
EOSINOPHIL # BLD: 0.04 K/UL (ref 0–0.44)
EOSINOPHILS RELATIVE PERCENT: 1 % (ref 1–4)
ERYTHROCYTE [DISTWIDTH] IN BLOOD BY AUTOMATED COUNT: 13.8 % (ref 11.8–14.4)
FIO2 ON VENT: ABNORMAL %
FOLATE SERPL-MCNC: >20 NG/ML (ref 4.8–24.2)
GFR SERPL CREATININE-BSD FRML MDRD: >60 ML/MIN/1.73M2
GLUCOSE SERPL-MCNC: 98 MG/DL (ref 74–99)
HCO3 VENOUS: 46.5 MMOL/L (ref 24–30)
HCT VFR BLD AUTO: 52.9 % (ref 40.7–50.3)
HGB BLD-MCNC: 17.1 G/DL (ref 13–17)
IMM GRANULOCYTES # BLD AUTO: 0.05 K/UL (ref 0–0.3)
IMM GRANULOCYTES NFR BLD: 1 %
LYMPHOCYTES NFR BLD: 0.99 K/UL (ref 1.1–3.7)
LYMPHOCYTES RELATIVE PERCENT: 19 % (ref 24–43)
MCH RBC QN AUTO: 32.6 PG (ref 25.2–33.5)
MCHC RBC AUTO-ENTMCNC: 32.3 G/DL (ref 28.4–34.8)
MCV RBC AUTO: 100.8 FL (ref 82.6–102.9)
MONOCYTES NFR BLD: 0.7 K/UL (ref 0.1–1.2)
MONOCYTES NFR BLD: 13 % (ref 3–12)
NEUTROPHILS NFR BLD: 66 % (ref 36–65)
NEUTS SEG NFR BLD: 3.47 K/UL (ref 1.5–8.1)
NRBC BLD-RTO: 0 PER 100 WBC
O2 SAT, VEN: 55.2 % (ref 60–85)
PCO2, VEN: 87.2 MM HG (ref 39–55)
PH VENOUS: 7.35 (ref 7.32–7.42)
PLATELET # BLD AUTO: ABNORMAL K/UL (ref 138–453)
PLATELET, FLUORESCENCE: ABNORMAL K/UL (ref 138–453)
PO2, VEN: 33.3 MM HG (ref 30–50)
POSITIVE BASE EXCESS, VEN: 13.8 MMOL/L (ref 0–2)
POTASSIUM SERPL-SCNC: 4.8 MMOL/L (ref 3.7–5.3)
RBC # BLD AUTO: 5.25 M/UL (ref 4.21–5.77)
REASON FOR REJECTION: NORMAL
SODIUM SERPL-SCNC: 134 MMOL/L (ref 136–145)
SPECIMEN SOURCE: NORMAL
VIT B12 SERPL-MCNC: 1139 PG/ML (ref 232–1245)
WBC OTHER # BLD: 5.3 K/UL (ref 3.5–11.3)
ZZ NTE CLEAN UP: ORDERED TEST: NORMAL

## 2023-10-12 PROCEDURE — 51798 US URINE CAPACITY MEASURE: CPT

## 2023-10-12 PROCEDURE — 82607 VITAMIN B-12: CPT

## 2023-10-12 PROCEDURE — 82805 BLOOD GASES W/O2 SATURATION: CPT

## 2023-10-12 PROCEDURE — 99233 SBSQ HOSP IP/OBS HIGH 50: CPT | Performed by: NURSE PRACTITIONER

## 2023-10-12 PROCEDURE — 6370000000 HC RX 637 (ALT 250 FOR IP): Performed by: STUDENT IN AN ORGANIZED HEALTH CARE EDUCATION/TRAINING PROGRAM

## 2023-10-12 PROCEDURE — 94761 N-INVAS EAR/PLS OXIMETRY MLT: CPT

## 2023-10-12 PROCEDURE — 93306 TTE W/DOPPLER COMPLETE: CPT | Performed by: INTERNAL MEDICINE

## 2023-10-12 PROCEDURE — 6360000002 HC RX W HCPCS: Performed by: INTERNAL MEDICINE

## 2023-10-12 PROCEDURE — 82746 ASSAY OF FOLIC ACID SERUM: CPT

## 2023-10-12 PROCEDURE — 97530 THERAPEUTIC ACTIVITIES: CPT

## 2023-10-12 PROCEDURE — 99233 SBSQ HOSP IP/OBS HIGH 50: CPT | Performed by: INTERNAL MEDICINE

## 2023-10-12 PROCEDURE — 97162 PT EVAL MOD COMPLEX 30 MIN: CPT

## 2023-10-12 PROCEDURE — 94640 AIRWAY INHALATION TREATMENT: CPT

## 2023-10-12 PROCEDURE — 85520 HEPARIN ASSAY: CPT

## 2023-10-12 PROCEDURE — 85025 COMPLETE CBC W/AUTO DIFF WBC: CPT

## 2023-10-12 PROCEDURE — 80048 BASIC METABOLIC PNL TOTAL CA: CPT

## 2023-10-12 PROCEDURE — 36415 COLL VENOUS BLD VENIPUNCTURE: CPT

## 2023-10-12 PROCEDURE — 6370000000 HC RX 637 (ALT 250 FOR IP)

## 2023-10-12 PROCEDURE — 2060000000 HC ICU INTERMEDIATE R&B

## 2023-10-12 PROCEDURE — 2700000000 HC OXYGEN THERAPY PER DAY

## 2023-10-12 PROCEDURE — 97110 THERAPEUTIC EXERCISES: CPT

## 2023-10-12 PROCEDURE — 85055 RETICULATED PLATELET ASSAY: CPT

## 2023-10-12 PROCEDURE — 93306 TTE W/DOPPLER COMPLETE: CPT

## 2023-10-12 RX ORDER — ATORVASTATIN CALCIUM 80 MG/1
80 TABLET, FILM COATED ORAL NIGHTLY
Status: DISCONTINUED | OUTPATIENT
Start: 2023-10-12 | End: 2023-10-14 | Stop reason: HOSPADM

## 2023-10-12 RX ORDER — ASPIRIN 81 MG/1
81 TABLET, CHEWABLE ORAL DAILY
Status: DISCONTINUED | OUTPATIENT
Start: 2023-10-12 | End: 2023-10-14 | Stop reason: HOSPADM

## 2023-10-12 RX ADMIN — BUDESONIDE AND FORMOTEROL FUMARATE DIHYDRATE 2 PUFF: 160; 4.5 AEROSOL RESPIRATORY (INHALATION) at 07:26

## 2023-10-12 RX ADMIN — DOXYCYCLINE HYCLATE 100 MG: 100 TABLET, COATED ORAL at 21:43

## 2023-10-12 RX ADMIN — FOLIC ACID 1 MG: 1 TABLET ORAL at 08:56

## 2023-10-12 RX ADMIN — HEPARIN SODIUM 20 UNITS/KG/HR: 10000 INJECTION, SOLUTION INTRAVENOUS at 12:07

## 2023-10-12 RX ADMIN — DOXYCYCLINE HYCLATE 100 MG: 100 TABLET, COATED ORAL at 08:56

## 2023-10-12 RX ADMIN — IPRATROPIUM BROMIDE AND ALBUTEROL SULFATE 1 DOSE: 2.5; .5 SOLUTION RESPIRATORY (INHALATION) at 20:24

## 2023-10-12 RX ADMIN — IPRATROPIUM BROMIDE AND ALBUTEROL SULFATE 1 DOSE: 2.5; .5 SOLUTION RESPIRATORY (INHALATION) at 11:26

## 2023-10-12 RX ADMIN — Medication 1 TABLET: at 08:56

## 2023-10-12 RX ADMIN — AMLODIPINE BESYLATE 10 MG: 10 TABLET ORAL at 08:56

## 2023-10-12 RX ADMIN — HEPARIN SODIUM 2000 UNITS: 1000 INJECTION INTRAVENOUS; SUBCUTANEOUS at 05:12

## 2023-10-12 RX ADMIN — IPRATROPIUM BROMIDE AND ALBUTEROL SULFATE 1 DOSE: 2.5; .5 SOLUTION RESPIRATORY (INHALATION) at 15:51

## 2023-10-12 RX ADMIN — Medication 100 MG: at 08:56

## 2023-10-12 RX ADMIN — FAMOTIDINE 20 MG: 20 TABLET, FILM COATED ORAL at 21:43

## 2023-10-12 RX ADMIN — PREDNISONE 50 MG: 20 TABLET ORAL at 08:56

## 2023-10-12 RX ADMIN — FAMOTIDINE 20 MG: 20 TABLET, FILM COATED ORAL at 08:56

## 2023-10-12 RX ADMIN — BUDESONIDE AND FORMOTEROL FUMARATE DIHYDRATE 2 PUFF: 160; 4.5 AEROSOL RESPIRATORY (INHALATION) at 20:24

## 2023-10-12 RX ADMIN — ATORVASTATIN CALCIUM 80 MG: 80 TABLET, FILM COATED ORAL at 21:43

## 2023-10-12 RX ADMIN — ASPIRIN 81 MG: 81 TABLET, CHEWABLE ORAL at 11:57

## 2023-10-12 RX ADMIN — IPRATROPIUM BROMIDE AND ALBUTEROL SULFATE 1 DOSE: 2.5; .5 SOLUTION RESPIRATORY (INHALATION) at 07:26

## 2023-10-12 NOTE — CARE COORDINATION
[facility] Quality Flow/Interdisciplinary Rounds Progress Note    Quality Flow Rounds held on October 12, 2023 at [address] [facility] Attending:  Bedside Nurse, , , and Nursing Unit Leadership    Barriers to Discharge: clinical status    Anticipated Discharge Date:   10/14/23    Anticipated Discharge Disposition: home    Readmission Risk              Risk of Unplanned Readmission:  16           Discussed patient goal for the day, patient clinical progression, and barriers to discharge. On 8L HFNC. Plan to titrate down oxygen today. Needs PT/OT evaluations. Plan to return home independent.       Eric Varner RN  October 12, 2023

## 2023-10-12 NOTE — PROGRESS NOTES
Physical Therapy  Facility/Department: [address] STEPDOWN  Physical Therapy Initial Assessment    Name: Joy Reeves  : 1960  MRN: [id number]  Date of Service: 10/12/2023    No chief complaint on file. Discharge Recommendations:    Further therapy recommended at discharge. PT Equipment Recommendations  Equipment Needed: No  Other: Pt has cane and RW      Patient Diagnosis(es): The encounter diagnosis was Acute exacerbation of chronic obstructive pulmonary disease (COPD) ([address] Past Medical History:  has no past medical history on file. Past Surgical History:  has no past surgical history on file. Assessment   Body Structures, Functions, Activity Limitations Requiring Skilled Therapeutic Intervention: Decreased functional mobility ; Decreased strength;Decreased endurance;Decreased balance  Assessment: Pt grossly CGA to SBA, amb 250' RW CGA. Pt does not use AD baseline. Pt would benefit from continued acute PT to address deficits. Therapy Prognosis: Good  Decision Making: Medium Complexity  Requires PT Follow-Up: Yes  Activity Tolerance  Activity Tolerance: Patient tolerated treatment well     Plan   Physcial Therapy Plan  General Plan:  (5-6x/wk)  Current Treatment Recommendations: Strengthening, Balance training, Gait training, Functional mobility training, Stair training, Transfer training, Endurance training, Home exercise program, Safety education & training, Patient/Caregiver education & training, Equipment evaluation, education, & procurement, Therapeutic activities  Safety Devices  Type of Devices: Call light within reach, Nurse notified, Patient at risk for falls, All fall risk precautions in place  Restraints  Restraints Initially in Place: No     Restrictions  Restrictions/Precautions  Restrictions/Precautions: General Precautions, Fall Risk, Seizure, Isolation (droplet)  Required Braces or Orthoses?: No  Position Activity Restriction  Other position/activity restrictions: fall prec.

## 2023-10-12 NOTE — PROGRESS NOTES
Writer has talked with patient about his cardiac concerns. Patient does not want any cardiac cath done if it comes to that. Writer notified Dr Lorain Gitelman fellow, as well as Med intern on call. Also Anti-Xa 0.62. No changes needed for heparin at this time.

## 2023-10-12 NOTE — PLAN OF CARE
Problem: Safety - Adult  Goal: Free from fall injury  Outcome: Progressing     Problem: Respiratory - Adult  Goal: Achieves optimal ventilation and oxygenation  10/12/2023 1109 by Prerna Anaya, [facility]  Outcome: Progressing  Flowsheets (Taken 10/12/2023 1109)  Achieves optimal ventilation and oxygenation:   Assess for changes in mentation and behavior   Assess for changes in respiratory status   Position to facilitate oxygenation and minimize respiratory effort   Oxygen supplementation based on oxygen saturation or arterial blood gases   Encourage broncho-pulmonary hygiene including cough, deep breathe, incentive spirometry   Assess and instruct to report shortness of breath or any respiratory difficulty   Respiratory therapy support as indicated

## 2023-10-12 NOTE — PLAN OF CARE
Problem: Discharge Planning  Goal: Discharge to home or other facility with appropriate resources  10/12/2023 0413 by Nhan Gordon RN  Outcome: Progressing  10/11/2023 1744 by Freddy Moe RN  Outcome: Progressing  Flowsheets (Taken 10/11/2023 1308)  Discharge to home or other facility with appropriate resources: Identify barriers to discharge with patient and caregiver     Problem: Safety - Adult  Goal: Free from fall injury  10/12/2023 0413 by Nhan Gordon RN  Outcome: Progressing  10/11/2023 1744 by Freddy Moe RN  Outcome: Progressing     Problem: Pain  Goal: Verbalizes/displays adequate comfort level or baseline comfort level  Outcome: Progressing     Problem: Skin/Tissue Integrity  Goal: Absence of new skin breakdown  Description: 1. Monitor for areas of redness and/or skin breakdown  2. Assess vascular access sites hourly  3. Every 4-6 hours minimum:  Change oxygen saturation probe site  4. Every 4-6 hours:  If on nasal continuous positive airway pressure, respiratory therapy assess nares and determine need for appliance change or resting period.   Outcome: Progressing     Problem: Respiratory - Adult  Goal: Achieves optimal ventilation and oxygenation  Outcome: Progressing

## 2023-10-12 NOTE — PROGRESS NOTES
[facility]  Progress Note                   Date:   10/12/2023  Patient name: Brooks Pryor  Date of admission:  10/9/2023  6:00 AM  MRN:   [id number]  YOB: 1960  PCP: No primary care provider on file. Reason for Admission: Acute exacerbation of chronic obstructive pulmonary disease (COPD) ([address]) [J44.1]    Subjective:       Clinical Changes /Abnormalities: Stable. No acute CV issues/concerns overnight. Labs,vitals, & tele reviewed. Echo completed and pending read. No AM BMP today. Review of Systems    Medications:   Scheduled Meds:   aspirin  81 mg Oral Daily    atorvastatin  80 mg Oral Nightly    multivitamin  1 tablet Oral Daily    folic acid  1 mg Oral Daily    famotidine  20 mg Oral BID    amLODIPine  10 mg Oral Daily    ipratropium 0.5 mg-albuterol 2.5 mg  1 Dose Inhalation Q4H WA RT    budesonide-formoterol  2 puff Inhalation BID RT    nicotine  1 patch TransDERmal Daily    sodium chloride flush  5-40 mL IntraVENous 2 times per day    thiamine  100 mg Oral Daily    sodium chloride flush  5-40 mL IntraVENous 2 times per day    doxycycline hyclate  100 mg Orogastric 2 times per day    predniSONE  50 mg Orogastric Daily     Continuous Infusions:   heparin (PORCINE) Infusion 20 Units/kg/hr (10/12/23 1207)    sodium chloride      sodium chloride Stopped (10/10/23 2334)     CBC:   Recent Labs     10/10/23  1223 10/11/23  0530 10/12/23  0412   WBC 6.6 4.8 5.3   HGB 18.2* 16.6 17.1*    154 See Reflexed IPF Result     BMP:    Recent Labs     10/09/23  1907 10/09/23  2210 10/10/23  0833 10/11/23  0530   * 129* 132* 137   K 5.3 4.7 4.5 4.2   CL 87* 89* 90* 95*   CO2 29 32* 34* 38*   BUN 17  --  15 12   CREATININE 0.5*  --  0.6* 0.6*   GLUCOSE 115*  --  85 82     Hepatic:No results for input(s): \"AST\", \"ALT\", \"ALB\", \"BILITOT\", \"ALKPHOS\" in the last 72 hours.   Troponin:   Recent Labs     10/11/23  1012 10/11/23  1629 10/11/23  2214   TROPHS 66* 36* 41*     BNP: No

## 2023-10-12 NOTE — PROGRESS NOTES
Or  LORazepam, 3 mg, Q1H PRN   Or  LORazepam, 3 mg, Q1H PRN   Or  LORazepam, 4 mg, Q1H PRN   Or  LORazepam, 4 mg, Q1H PRN  albuterol, 2.5 mg, Q4H PRN  sodium chloride flush, 5-40 mL, PRN  sodium chloride, , PRN  ondansetron, 4 mg, Q8H PRN   Or  ondansetron, 4 mg, Q6H PRN  polyethylene glycol, 17 g, Daily PRN  acetaminophen, 650 mg, Q6H PRN   Or  acetaminophen, 650 mg, Q6H PRN        Diagnostic Labs:  CBC:   Recent Labs     10/10/23  1223 10/11/23  0530 10/12/23  0412   WBC 6.6 4.8 5.3   RBC 5.69 5.19 5.25   HGB 18.2* 16.6 17.1*   HCT 56.9* 54.1* 52.9*   .0 104.2* 100.8   RDW 13.9 13.8 13.8    154 See Reflexed IPF Result     BMP:   Recent Labs     10/09/23  1153 10/09/23  1907 10/09/23  2210 10/10/23  0833 10/11/23  0530   * 131* 129* 132* 137   K 5.8* 5.3 4.7 4.5 4.2   CL 88* 87* 89* 90* 95*   CO2 27 29 32* 34* 38*   PHOS 2.6  --   --   --   --    BUN 18 17  --  15 12   CREATININE 0.4* 0.5*  --  0.6* 0.6*     BNP: No results for input(s): \"BNP\" in the last 72 hours. PT/INR:   Recent Labs     10/10/23  1223   PROTIME 13.8   INR 1.1     APTT:   Recent Labs     10/10/23  1223   APTT 26.5     CARDIAC ENZYMES: No results for input(s): \"CKMB\", \"CKMBINDEX\", \"TROPONINI\" in the last 72 hours. Invalid input(s): \"CKTOTAL;3\"  FASTING LIPID PANEL:No results found for: \"CHOL\", \"HDL\", \"TRIG\"  LIVER PROFILE:   Recent Labs     10/09/23  1153   AST 45*   ALT 44*   BILITOT 0.8   ALKPHOS 57      MICROBIOLOGY:   Lab Results   Component Value Date/Time    CULTURE NO GROWTH 3 DAYS 10/09/2023 08:50 AM       Imaging:    XR CHEST PORTABLE    Result Date: 10/9/2023  Diffuse mild interstitial prominence.        ASSESSMENT & PLAN     ASSESSMENT / PLAN:     Acute on chronic hypoxemic and hypercarbic respiratory failure secondary to COPD with exacerbation  - likely secondary rhino/enteroviral infection  - At home,on no oxygen or inhalers  -10/9 Intubated--> 10/10 Extubated  -On 5 L oxygen  -Advised to use BiPAP  -On CIWA protocol as patient was placed on CIWA protocol for concern for alcohol withdrawal as patient is high risk for hypercapnia. .  Currently patient is on heparin drip 2D echocardiogram is pending follow-up with cardiology. Continue with DuoNeb aerosol and on Symbicort. Please note that this chart was generated using voice recognition Dragon dictation software. Although every effort was made to ensure the accuracy of this automated transcription, some errors in transcription may have occurred.      Ira Aguila MD  10/12/2023 11:09 AM

## 2023-10-12 NOTE — PLAN OF CARE
Problem: Respiratory - Adult  Goal: Achieves optimal ventilation and oxygenation  10/12/2023 1109 by Prerna Anaya, P  Outcome: Progressing  Flowsheets (Taken 10/12/2023 1109)  Achieves optimal ventilation and oxygenation:   Assess for changes in mentation and behavior   Assess for changes in respiratory status   Position to facilitate oxygenation and minimize respiratory effort   Oxygen supplementation based on oxygen saturation or arterial blood gases   Encourage broncho-pulmonary hygiene including cough, deep breathe, incentive spirometry   Assess and instruct to report shortness of breath or any respiratory difficulty   Respiratory therapy support as indicated

## 2023-10-13 LAB
ANION GAP SERPL CALCULATED.3IONS-SCNC: 5 MMOL/L (ref 9–17)
ANTI-XA UNFRAC HEPARIN: 0.67 IU/L
ANTI-XA UNFRAC HEPARIN: 0.7 IU/L
ANTI-XA UNFRAC HEPARIN: 0.78 IU/L
BASOPHILS # BLD: <0.03 K/UL (ref 0–0.2)
BASOPHILS NFR BLD: 0 % (ref 0–2)
BODY TEMPERATURE: 37
BUN SERPL-MCNC: 11 MG/DL (ref 8–23)
CALCIUM SERPL-MCNC: 8.7 MG/DL (ref 8.6–10.4)
CHLORIDE SERPL-SCNC: 92 MMOL/L (ref 98–107)
CO2 SERPL-SCNC: 38 MMOL/L (ref 20–31)
COHGB MFR BLD: 1.8 % (ref 0–5)
CREAT SERPL-MCNC: 0.4 MG/DL (ref 0.7–1.2)
EOSINOPHIL # BLD: 0.04 K/UL (ref 0–0.44)
EOSINOPHILS RELATIVE PERCENT: 1 % (ref 1–4)
ERYTHROCYTE [DISTWIDTH] IN BLOOD BY AUTOMATED COUNT: 13.6 % (ref 11.8–14.4)
FIO2 ON VENT: ABNORMAL %
GFR SERPL CREATININE-BSD FRML MDRD: >60 ML/MIN/1.73M2
GLUCOSE SERPL-MCNC: 91 MG/DL (ref 70–99)
HCO3 VENOUS: 43.1 MMOL/L (ref 24–30)
HCT VFR BLD AUTO: 52.5 % (ref 40.7–50.3)
HGB BLD-MCNC: 17 G/DL (ref 13–17)
IMM GRANULOCYTES # BLD AUTO: 0.03 K/UL (ref 0–0.3)
IMM GRANULOCYTES NFR BLD: 1 %
LYMPHOCYTES NFR BLD: 1.01 K/UL (ref 1.1–3.7)
LYMPHOCYTES RELATIVE PERCENT: 16 % (ref 24–43)
MCH RBC QN AUTO: 32.6 PG (ref 25.2–33.5)
MCHC RBC AUTO-ENTMCNC: 32.4 G/DL (ref 28.4–34.8)
MCV RBC AUTO: 100.8 FL (ref 82.6–102.9)
MONOCYTES NFR BLD: 0.72 K/UL (ref 0.1–1.2)
MONOCYTES NFR BLD: 12 % (ref 3–12)
NEUTROPHILS NFR BLD: 70 % (ref 36–65)
NEUTS SEG NFR BLD: 4.46 K/UL (ref 1.5–8.1)
NRBC BLD-RTO: 0 PER 100 WBC
O2 SAT, VEN: 82.6 % (ref 60–85)
PCO2, VEN: 78.4 MM HG (ref 39–55)
PH VENOUS: 7.36 (ref 7.32–7.42)
PLATELET # BLD AUTO: 312 K/UL (ref 138–453)
PMV BLD AUTO: 10.5 FL (ref 8.1–13.5)
PO2, VEN: 52.2 MM HG (ref 30–50)
POSITIVE BASE EXCESS, VEN: 12.6 MMOL/L (ref 0–2)
POTASSIUM SERPL-SCNC: 4.2 MMOL/L (ref 3.7–5.3)
RBC # BLD AUTO: 5.21 M/UL (ref 4.21–5.77)
SODIUM SERPL-SCNC: 135 MMOL/L (ref 135–144)
WBC OTHER # BLD: 6.3 K/UL (ref 3.5–11.3)

## 2023-10-13 PROCEDURE — 97116 GAIT TRAINING THERAPY: CPT

## 2023-10-13 PROCEDURE — 6360000002 HC RX W HCPCS

## 2023-10-13 PROCEDURE — 85520 HEPARIN ASSAY: CPT

## 2023-10-13 PROCEDURE — 99233 SBSQ HOSP IP/OBS HIGH 50: CPT | Performed by: NURSE PRACTITIONER

## 2023-10-13 PROCEDURE — 6370000000 HC RX 637 (ALT 250 FOR IP): Performed by: STUDENT IN AN ORGANIZED HEALTH CARE EDUCATION/TRAINING PROGRAM

## 2023-10-13 PROCEDURE — 97535 SELF CARE MNGMENT TRAINING: CPT

## 2023-10-13 PROCEDURE — 2700000000 HC OXYGEN THERAPY PER DAY

## 2023-10-13 PROCEDURE — 2580000003 HC RX 258: Performed by: STUDENT IN AN ORGANIZED HEALTH CARE EDUCATION/TRAINING PROGRAM

## 2023-10-13 PROCEDURE — 99233 SBSQ HOSP IP/OBS HIGH 50: CPT | Performed by: INTERNAL MEDICINE

## 2023-10-13 PROCEDURE — 85025 COMPLETE CBC W/AUTO DIFF WBC: CPT

## 2023-10-13 PROCEDURE — 6360000002 HC RX W HCPCS: Performed by: INTERNAL MEDICINE

## 2023-10-13 PROCEDURE — 6370000000 HC RX 637 (ALT 250 FOR IP)

## 2023-10-13 PROCEDURE — 94761 N-INVAS EAR/PLS OXIMETRY MLT: CPT

## 2023-10-13 PROCEDURE — 2060000000 HC ICU INTERMEDIATE R&B

## 2023-10-13 PROCEDURE — 94640 AIRWAY INHALATION TREATMENT: CPT

## 2023-10-13 PROCEDURE — 36415 COLL VENOUS BLD VENIPUNCTURE: CPT

## 2023-10-13 PROCEDURE — 80048 BASIC METABOLIC PNL TOTAL CA: CPT

## 2023-10-13 PROCEDURE — 94660 CPAP INITIATION&MGMT: CPT

## 2023-10-13 PROCEDURE — 97166 OT EVAL MOD COMPLEX 45 MIN: CPT

## 2023-10-13 PROCEDURE — 97530 THERAPEUTIC ACTIVITIES: CPT

## 2023-10-13 PROCEDURE — 82805 BLOOD GASES W/O2 SATURATION: CPT

## 2023-10-13 RX ORDER — ENOXAPARIN SODIUM 100 MG/ML
30 INJECTION SUBCUTANEOUS DAILY
Status: DISCONTINUED | OUTPATIENT
Start: 2023-10-13 | End: 2023-10-13

## 2023-10-13 RX ORDER — ENOXAPARIN SODIUM 100 MG/ML
30 INJECTION SUBCUTANEOUS 2 TIMES DAILY
Status: DISCONTINUED | OUTPATIENT
Start: 2023-10-13 | End: 2023-10-14 | Stop reason: HOSPADM

## 2023-10-13 RX ADMIN — AMLODIPINE BESYLATE 10 MG: 10 TABLET ORAL at 10:12

## 2023-10-13 RX ADMIN — IPRATROPIUM BROMIDE AND ALBUTEROL SULFATE 1 DOSE: 2.5; .5 SOLUTION RESPIRATORY (INHALATION) at 20:39

## 2023-10-13 RX ADMIN — DOXYCYCLINE HYCLATE 100 MG: 100 TABLET, COATED ORAL at 10:12

## 2023-10-13 RX ADMIN — Medication 1 TABLET: at 10:12

## 2023-10-13 RX ADMIN — FAMOTIDINE 20 MG: 20 TABLET, FILM COATED ORAL at 10:12

## 2023-10-13 RX ADMIN — ENOXAPARIN SODIUM 30 MG: 100 INJECTION SUBCUTANEOUS at 20:39

## 2023-10-13 RX ADMIN — Medication 100 MG: at 10:14

## 2023-10-13 RX ADMIN — FOLIC ACID 1 MG: 1 TABLET ORAL at 10:11

## 2023-10-13 RX ADMIN — FAMOTIDINE 20 MG: 20 TABLET, FILM COATED ORAL at 20:39

## 2023-10-13 RX ADMIN — HEPARIN SODIUM 18 UNITS/KG/HR: 10000 INJECTION, SOLUTION INTRAVENOUS at 00:10

## 2023-10-13 RX ADMIN — DOXYCYCLINE HYCLATE 100 MG: 100 TABLET, COATED ORAL at 20:42

## 2023-10-13 RX ADMIN — BUDESONIDE AND FORMOTEROL FUMARATE DIHYDRATE 2 PUFF: 160; 4.5 AEROSOL RESPIRATORY (INHALATION) at 08:40

## 2023-10-13 RX ADMIN — HEPARIN SODIUM 17 UNITS/KG/HR: 10000 INJECTION, SOLUTION INTRAVENOUS at 12:12

## 2023-10-13 RX ADMIN — IPRATROPIUM BROMIDE AND ALBUTEROL SULFATE 1 DOSE: 2.5; .5 SOLUTION RESPIRATORY (INHALATION) at 08:40

## 2023-10-13 RX ADMIN — SODIUM CHLORIDE, PRESERVATIVE FREE 10 ML: 5 INJECTION INTRAVENOUS at 20:43

## 2023-10-13 RX ADMIN — BUDESONIDE AND FORMOTEROL FUMARATE DIHYDRATE 2 PUFF: 160; 4.5 AEROSOL RESPIRATORY (INHALATION) at 20:39

## 2023-10-13 RX ADMIN — IPRATROPIUM BROMIDE AND ALBUTEROL SULFATE 1 DOSE: 2.5; .5 SOLUTION RESPIRATORY (INHALATION) at 11:58

## 2023-10-13 RX ADMIN — IPRATROPIUM BROMIDE AND ALBUTEROL SULFATE 1 DOSE: 2.5; .5 SOLUTION RESPIRATORY (INHALATION) at 15:51

## 2023-10-13 RX ADMIN — ATORVASTATIN CALCIUM 80 MG: 80 TABLET, FILM COATED ORAL at 20:39

## 2023-10-13 RX ADMIN — PREDNISONE 50 MG: 20 TABLET ORAL at 10:12

## 2023-10-13 RX ADMIN — ASPIRIN 81 MG: 81 TABLET, CHEWABLE ORAL at 10:12

## 2023-10-13 NOTE — PROGRESS NOTES
[facility]  Progress Note                   Date:   10/13/2023  Patient name: Kaci Saavedra  Date of admission:  10/9/2023  6:00 AM  MRN:   [id number]  YOB: 1960  PCP: No primary care provider on file. Reason for Admission: Acute exacerbation of chronic obstructive pulmonary disease (COPD) ([address]) [J44.1]    Subjective:       Clinical Changes /Abnormalities: Stable. No acute CV issues/concerns overnight. Labs,vitals, & tele reviewed. On 02       Medications:   Scheduled Meds:   aspirin  81 mg Oral Daily    atorvastatin  80 mg Oral Nightly    multivitamin  1 tablet Oral Daily    folic acid  1 mg Oral Daily    famotidine  20 mg Oral BID    amLODIPine  10 mg Oral Daily    ipratropium 0.5 mg-albuterol 2.5 mg  1 Dose Inhalation Q4H WA RT    budesonide-formoterol  2 puff Inhalation BID RT    nicotine  1 patch TransDERmal Daily    sodium chloride flush  5-40 mL IntraVENous 2 times per day    thiamine  100 mg Oral Daily    sodium chloride flush  5-40 mL IntraVENous 2 times per day    doxycycline hyclate  100 mg Orogastric 2 times per day    predniSONE  50 mg Orogastric Daily     Continuous Infusions:   heparin (PORCINE) Infusion 17 Units/kg/hr (10/13/23 1212)    sodium chloride      sodium chloride Stopped (10/10/23 2334)     CBC:   Recent Labs     10/11/23  0530 10/12/23  0412 10/13/23  0227   WBC 4.8 5.3 6.3   HGB 16.6 17.1* 17.0    See Reflexed IPF Result 312       BMP:    Recent Labs     10/11/23  0530 10/12/23  1159 10/13/23  0227    134* 135   K 4.2 4.8 4.2   CL 95* 90* 92*   CO2 38* 34* 38*   BUN 12 11 11   CREATININE 0.6* 0.6* 0.4*   GLUCOSE 82 98 91       Hepatic:No results for input(s): \"AST\", \"ALT\", \"ALB\", \"BILITOT\", \"ALKPHOS\" in the last 72 hours. Troponin:   Recent Labs     10/11/23  1012 10/11/23  1629 10/11/23  2214   TROPHS 66* 36* 41*       BNP: No results for input(s): \"BNP\" in the last 72 hours.   Lipids: No results for input(s): \"CHOL\", \"HDL\" in the last

## 2023-10-13 NOTE — PLAN OF CARE
Problem: Discharge Planning  Goal: Discharge to home or other facility with appropriate resources  Outcome: Progressing  Flowsheets (Taken 10/12/2023 2000)  Discharge to home or other facility with appropriate resources: Identify barriers to discharge with patient and caregiver     Problem: Safety - Adult  Goal: Free from fall injury  10/13/2023 0502 by Pa Jaimes RN  Outcome: Progressing  Flowsheets (Taken 10/12/2023 2000)  Free From Fall Injury: Instruct family/caregiver on patient safety  10/12/2023 1826 by Jordy Burnett RN  Outcome: Progressing     Problem: Pain  Goal: Verbalizes/displays adequate comfort level or baseline comfort level  Outcome: Progressing     Problem: Skin/Tissue Integrity  Goal: Absence of new skin breakdown  Description: 1. Monitor for areas of redness and/or skin breakdown  2. Assess vascular access sites hourly  3. Every 4-6 hours minimum:  Change oxygen saturation probe site  4. Every 4-6 hours:  If on nasal continuous positive airway pressure, respiratory therapy assess nares and determine need for appliance change or resting period.   Outcome: Progressing

## 2023-10-13 NOTE — PROGRESS NOTES
Symbicort,Duoneb and Albuterol inhaler  -On doxycycline  -On prednisone 50 mg OD       NSTEMI   -No chest pain  -Troponins elevated  -EKG - NSR, T wave inversion in anterolateral leads  -Cardiology on board  -2d echo com            Alcohol withdrawal  -drinks 1 pack a day  -On CIWA protocol  -On folic acid and thiamine  -Monitor for withdrawal symptoms        HTN  -at home,no meds  -started on amlodipine 10 mg OD            Sofie Crowe MD  Internal Medicine Resident, PGY-2  [address]; [place], [place]  10/13/2023, 3:25 PM     Attending Physician Statement  I have discussed the care of Ayaka Paris, including pertinent history and exam findings with the resident. I have reviewed the key elements of all parts of the encounter with the resident. I have seen and examined the patient with the resident. I agree with the assessment and plan and status of the problem list as documented. I have seen the patient during rounds this morning, chart reviewed overnight events noted. Patient continued to be on nasal cannula 5 L he is down from 6 L to 7 L and down to 4 L and saturating above 90% does get desaturate intermittently. According patient he is able to ambulate to bathroom and with physical therapy walk outside. He does have cough denies significant sputum production bilateral breath sounds severely reduced and distant. Discussed with patient about chronic hypoxic hypercapnic respiratory failure/this increase CO2 because of smoking and severe COPD patient has not used BiPAP BiPAP in the room discussed with patient not sure whether he will use BiPAP tonight. He is currently on heparin drip aspirin and statin we will discuss with cardiology about the need for continuation of heparin drip. Further ischemia work-up per cardiology.   Echocardiogram results seen and shows normal systolic function RV is moderately dilated secondary to chronic pulmonary hypertension likely secondary to COPD and chronic hypoxia. Will wean O2 to keep saturation 88 to 90%. Will need home O2 evaluation on discharge and discussed with patient that he will need home O2 and he agrees. We will try to wean O2 less than 4 L if possible and continue bronchodilators and prednisone. Please note that this chart was generated using voice recognition Dragon dictation software. Although every effort was made to ensure the accuracy of this automated transcription, some errors in transcription may have occurred.      Ruth Edouard MD  10/13/2023 6:58 PM

## 2023-10-13 NOTE — CARE COORDINATION
[id number] Flow/Interdisciplinary Rounds Progress Note    Quality Flow Rounds held on October 13, 2023 at [address] [facility] Attending:  Bedside Nurse, , and Nursing Unit Leadership    Barriers to Discharge: clinical status    Anticipated Discharge Date:   10/14/23    Anticipated Discharge Disposition: home w/ family    Readmission Risk              Risk of Unplanned Readmission:  12           Discussed patient goal for the day, patient clinical progression, and barriers to discharge. RN reports that patient remains on heparin gtt and 4L O2. Plan to return home with family at discharge. Patient will need home O2 evaluation ordered prior to discharge.       Evelyn Choe RN  October 13, 2023

## 2023-10-13 NOTE — PLAN OF CARE
Problem: Safety - Adult  Goal: Free from fall injury  10/13/2023 1847 by Janes Chung RN  Outcome: Progressing  10/13/2023 0502 by Magdaleno Vogel RN  Outcome: Progressing  Flowsheets (Taken 10/12/2023 2000)  Free From Fall Injury: Instruct family/caregiver on patient safety

## 2023-10-13 NOTE — PROGRESS NOTES
Home Oxygen Evaluation    Home Oxygen Evaluation completed. Patient is on 4 liters per minute via nasal cannula. Resting SpO2 = 95%  Resting SpO2 on room air = 86%    SpO2 on room air with exercise = NA  SpO2 on oxygen as above with exercise = NA    Nocturnal Oximetry with patient on room air is recommended is SpO2 is between 89% and 95% (requires additional order).     sudarshan victoria RCP  3:52 PM

## 2023-10-13 NOTE — PROGRESS NOTES
Occupational Therapy  Facility/Department: [address] STEPDOWN  Occupational Therapy Initial Assessment      Name: Johnson Radford  : 1960  MRN: [id number]  Date of Service: 10/13/2023    Discharge Recommendations:  Patient would benefit from continued therapy after discharge  OT Equipment Recommendations  Other: AE // DME recommendations for DC are TBD. Patient Diagnosis(es): The encounter diagnosis was Acute exacerbation of chronic obstructive pulmonary disease (COPD) ([address] Past Medical History:  has no past medical history on file. Past Surgical History:  has no past surgical history on file. Assessment   Performance deficits / Impairments: Decreased functional mobility ; Decreased endurance;Decreased ADL status; Decreased balance;Decreased strength;Decreased safe awareness;Decreased high-level IADLs  Assessment: Pt previously functioned Independently / Mod I for all ADLs and Mobility. At this time, he has impairments including: impaired balance, decreased endurance, decreased strength, increased fatigue, decreased overall functional activity tolerance - which impact his ability to regain his PLOF. Pt would benefit from continued (acute and post-acute) OT services, in order to regain functional indepence and return to PLOF.   Prognosis: Good  Decision Making: Medium Complexity  REQUIRES OT FOLLOW-UP: Yes  Activity Tolerance  Activity Tolerance: Patient Tolerated treatment well;Patient limited by fatigue          Plan   Occupational Therapy Plan  Times Per Week: 4-5x/week  Current Treatment Recommendations: Strengthening, Balance training, Functional mobility training, Endurance training, Equipment evaluation, education, & procurement, Patient/Caregiver education & training, Safety education & training, Self-Care / ADL, Home management training, Cognitive/Perceptual training       Restrictions  Restrictions/Precautions  Restrictions/Precautions: General Precautions, Fall Risk, Seizure, Isolation assistance; Increased time to complete; Adaptive equipment  Additional Comments: Min Cues for management of O2 line // integration of activity pacing. Vision  Vision: Impaired  Vision Exceptions: Wears glasses for reading  Hearing  Hearing: Within functional limits  Cognition  Overall Cognitive Status: WFL  Orientation  Overall Orientation Status: Within Functional Limits  Orientation Level: Oriented X4    Education Given To: Patient  Education Provided: Role of Therapy;Plan of Care;Precautions; ADL Adaptive Strategies;Transfer Training;Energy Conservation  Education Method: Demonstration;Verbal  Barriers to Learning: None  Education Outcome: Verbalized understanding;Continued education needed;Demonstrated understanding      AM-PAC Score  AM-PAC Inpatient Daily Activity Raw Score: 19 (10/13/23 1642)  AM-PAC Inpatient ADL T-Scale Score : 40.22 (10/13/23 1642)  ADL Inpatient CMS 0-100% Score: 42.8 (10/13/23 1642)  ADL Inpatient CMS G-Code Modifier : CK (10/13/23 1642)      Goals  Short Term Goals  Time Frame for Short Term Goals: Within 14 tx sessions, Pt will -  Short Term Goal 1: Demo Mod I and Good Integration of EC // Ax Pacing during UB & LB ADLs. Short Term Goal 2: Participate in ADL and Functional Transfers with Mod I with Good // Accurate Use of DME. Short Term Goal 3: Maintain Dynamic Standing Tolerance (~15 mins) during Functional tasks. Short Term Goal 4: Complete Functional Mobility with Mod I with Good Management of O2 line.        Therapy Time   Individual Concurrent Group Co-treatment   Time In 1402         Time Out 1428         Minutes 26         Timed Code Treatment Minutes: 16 Minutes (ADL)       RADHA Vo, OTR/L

## 2023-10-13 NOTE — PROGRESS NOTES
Physical Therapy  Facility/Department: [address] STEPDOWN  Physical Therapy Daily Treatment Note    Name: Georg Harada  : 1960  MRN: [id number]  Date of Service: 10/13/2023    Discharge Recommendations:  Patient would benefit from continued therapy after discharge     PT Equipment Recommendations  Equipment Needed: No  Other: Pt has cane and RW      Patient Diagnosis(es): The encounter diagnosis was Acute exacerbation of chronic obstructive pulmonary disease (COPD) ([address] Past Medical History:  has no past medical history on file. Past Surgical History:  has no past surgical history on file. Assessment   Body Structures, Functions, Activity Limitations Requiring Skilled Therapeutic Intervention: Decreased functional mobility ; Decreased strength;Decreased endurance;Decreased balance  Assessment: Pt performs all bed mobility and transfers SBA, ambulated 250ft without AD, on 4L O2 NC, CGA progressing to SBA. Fatigued d/t being SOB; politely refused stair negotiation training. Pt would benefit from continued acute PT to address deficits. Therapy Prognosis: Fair  Activity Tolerance  Activity Tolerance: Patient limited by fatigue;Patient limited by endurance; Other (comment) (Pt limited by dyspnea)     Plan   Physcial Therapy Plan  General Plan:  (5-6x/wk)  Current Treatment Recommendations: Strengthening, Balance training, Gait training, Functional mobility training, Stair training, Transfer training, Endurance training, Home exercise program, Safety education & training, Patient/Caregiver education & training, Equipment evaluation, education, & procurement, Therapeutic activities  Safety Devices  Type of Devices: Call light within reach, Nurse notified, Patient at risk for falls, All fall risk precautions in place  Restraints  Restraints Initially in Place: No     Restrictions  Restrictions/Precautions  Restrictions/Precautions: General Precautions, Fall Risk, Seizure, Isolation (Droplet)  Required Braces or and hamstring curls. Reps: x10 ea        OutComes Score  AM-PAC Score  AM-PAC Inpatient Mobility Raw Score : 21 (10/13/23 1019)  AM-PAC Inpatient T-Scale Score : 50.25 (10/13/23 1019)  Mobility Inpatient CMS 0-100% Score: 28.97 (10/13/23 1019)  Mobility Inpatient CMS G-Code Modifier : CJ (10/13/23 1019)    Goals  Short Term Goals  Time Frame for Short Term Goals: 14 visits  Short Term Goal 1: Pt will be Pako bed mobility  Short Term Goal 2: Pt will be Pako transfers  Short Term Goal 3: Pt will be Pako amb 250'  Short Term Goal 4: Pt will navigate 2 steps Pako for safe curb navigation       Education  Patient Education  Education Given To: Patient  Education Provided: Role of Therapy;Plan of Care;Energy Conservation  Education Provided Comments:  Inspirometer use, breathing techniques  Education Method: Demonstration;Verbal  Barriers to Learning: None  Education Outcome: Verbalized understanding;Demonstrated understanding      Therapy Time   Individual Concurrent Group Co-treatment   Time In 0952         Time Out 1017         Minutes 25         Timed Code Treatment Minutes: [address], [place]

## 2023-10-14 VITALS
SYSTOLIC BLOOD PRESSURE: 135 MMHG | HEIGHT: 72 IN | DIASTOLIC BLOOD PRESSURE: 83 MMHG | BODY MASS INDEX: 33.47 KG/M2 | RESPIRATION RATE: 20 BRPM | OXYGEN SATURATION: 94 % | TEMPERATURE: 97.4 F | HEART RATE: 92 BPM | WEIGHT: 247.1 LBS

## 2023-10-14 LAB
ANION GAP SERPL CALCULATED.3IONS-SCNC: 8 MMOL/L (ref 9–17)
BASOPHILS # BLD: <0.03 K/UL (ref 0–0.2)
BASOPHILS NFR BLD: 0 % (ref 0–2)
BUN SERPL-MCNC: 11 MG/DL (ref 8–23)
CALCIUM SERPL-MCNC: 9 MG/DL (ref 8.6–10.4)
CHLORIDE SERPL-SCNC: 91 MMOL/L (ref 98–107)
CO2 SERPL-SCNC: 33 MMOL/L (ref 20–31)
CREAT SERPL-MCNC: 0.4 MG/DL (ref 0.7–1.2)
EOSINOPHIL # BLD: 0.07 K/UL (ref 0–0.44)
EOSINOPHILS RELATIVE PERCENT: 1 % (ref 1–4)
ERYTHROCYTE [DISTWIDTH] IN BLOOD BY AUTOMATED COUNT: 13.8 % (ref 11.8–14.4)
GFR SERPL CREATININE-BSD FRML MDRD: >60 ML/MIN/1.73M2
GLUCOSE SERPL-MCNC: 83 MG/DL (ref 70–99)
HCT VFR BLD AUTO: 53.4 % (ref 40.7–50.3)
HGB BLD-MCNC: 17.4 G/DL (ref 13–17)
IMM GRANULOCYTES # BLD AUTO: <0.03 K/UL (ref 0–0.3)
IMM GRANULOCYTES NFR BLD: 0 %
LYMPHOCYTES NFR BLD: 1.38 K/UL (ref 1.1–3.7)
LYMPHOCYTES RELATIVE PERCENT: 24 % (ref 24–43)
MCH RBC QN AUTO: 32.2 PG (ref 25.2–33.5)
MCHC RBC AUTO-ENTMCNC: 32.6 G/DL (ref 28.4–34.8)
MCV RBC AUTO: 98.9 FL (ref 82.6–102.9)
MICROORGANISM SPEC CULT: NORMAL
MICROORGANISM SPEC CULT: NORMAL
MONOCYTES NFR BLD: 0.68 K/UL (ref 0.1–1.2)
MONOCYTES NFR BLD: 12 % (ref 3–12)
NEUTROPHILS NFR BLD: 62 % (ref 36–65)
NEUTS SEG NFR BLD: 3.53 K/UL (ref 1.5–8.1)
NRBC BLD-RTO: 0 PER 100 WBC
PLATELET # BLD AUTO: 195 K/UL (ref 138–453)
PMV BLD AUTO: 8.8 FL (ref 8.1–13.5)
POTASSIUM SERPL-SCNC: 4.4 MMOL/L (ref 3.7–5.3)
RBC # BLD AUTO: 5.4 M/UL (ref 4.21–5.77)
SERVICE CMNT-IMP: NORMAL
SERVICE CMNT-IMP: NORMAL
SODIUM SERPL-SCNC: 132 MMOL/L (ref 135–144)
SPECIMEN DESCRIPTION: NORMAL
SPECIMEN DESCRIPTION: NORMAL
WBC OTHER # BLD: 5.7 K/UL (ref 3.5–11.3)

## 2023-10-14 PROCEDURE — 80048 BASIC METABOLIC PNL TOTAL CA: CPT

## 2023-10-14 PROCEDURE — 6360000002 HC RX W HCPCS

## 2023-10-14 PROCEDURE — 6370000000 HC RX 637 (ALT 250 FOR IP)

## 2023-10-14 PROCEDURE — 94640 AIRWAY INHALATION TREATMENT: CPT

## 2023-10-14 PROCEDURE — 36415 COLL VENOUS BLD VENIPUNCTURE: CPT

## 2023-10-14 PROCEDURE — 99233 SBSQ HOSP IP/OBS HIGH 50: CPT | Performed by: INTERNAL MEDICINE

## 2023-10-14 PROCEDURE — 6370000000 HC RX 637 (ALT 250 FOR IP): Performed by: STUDENT IN AN ORGANIZED HEALTH CARE EDUCATION/TRAINING PROGRAM

## 2023-10-14 PROCEDURE — 2700000000 HC OXYGEN THERAPY PER DAY

## 2023-10-14 PROCEDURE — 85025 COMPLETE CBC W/AUTO DIFF WBC: CPT

## 2023-10-14 PROCEDURE — 94761 N-INVAS EAR/PLS OXIMETRY MLT: CPT

## 2023-10-14 PROCEDURE — 2580000003 HC RX 258

## 2023-10-14 RX ORDER — PREDNISONE 10 MG/1
TABLET ORAL
Qty: 30 TABLET | Refills: 0 | Status: SHIPPED | OUTPATIENT
Start: 2023-10-14

## 2023-10-14 RX ORDER — TIOTROPIUM BROMIDE 18 UG/1
18 CAPSULE ORAL; RESPIRATORY (INHALATION) DAILY
Qty: 30 CAPSULE | Refills: 3 | Status: SHIPPED | OUTPATIENT
Start: 2023-10-14 | End: 2024-02-11

## 2023-10-14 RX ORDER — AMLODIPINE BESYLATE 10 MG/1
10 TABLET ORAL DAILY
Qty: 30 TABLET | Refills: 3 | Status: SHIPPED | OUTPATIENT
Start: 2023-10-15

## 2023-10-14 RX ORDER — ATORVASTATIN CALCIUM 80 MG/1
80 TABLET, FILM COATED ORAL NIGHTLY
Qty: 30 TABLET | Refills: 3 | Status: SHIPPED | OUTPATIENT
Start: 2023-10-14

## 2023-10-14 RX ORDER — ASPIRIN 81 MG/1
81 TABLET, CHEWABLE ORAL DAILY
Qty: 30 TABLET | Refills: 3 | Status: SHIPPED | OUTPATIENT
Start: 2023-10-15

## 2023-10-14 RX ORDER — LANOLIN ALCOHOL/MO/W.PET/CERES
100 CREAM (GRAM) TOPICAL DAILY
Qty: 30 TABLET | Refills: 3 | Status: SHIPPED | OUTPATIENT
Start: 2023-10-15

## 2023-10-14 RX ORDER — FOLIC ACID 1 MG/1
1 TABLET ORAL DAILY
Qty: 30 TABLET | Refills: 3 | Status: SHIPPED | OUTPATIENT
Start: 2023-10-15

## 2023-10-14 RX ORDER — M-VIT,TX,IRON,MINS/CALC/FOLIC 27MG-0.4MG
1 TABLET ORAL DAILY
Qty: 30 TABLET | Refills: 3 | Status: SHIPPED | OUTPATIENT
Start: 2023-10-15 | End: 2024-02-12

## 2023-10-14 RX ORDER — BUDESONIDE AND FORMOTEROL FUMARATE DIHYDRATE 160; 4.5 UG/1; UG/1
2 AEROSOL RESPIRATORY (INHALATION)
Qty: 10.2 G | Refills: 3 | Status: SHIPPED | OUTPATIENT
Start: 2023-10-14 | End: 2023-10-14 | Stop reason: HOSPADM

## 2023-10-14 RX ADMIN — ASPIRIN 81 MG: 81 TABLET, CHEWABLE ORAL at 09:04

## 2023-10-14 RX ADMIN — PREDNISONE 50 MG: 20 TABLET ORAL at 09:04

## 2023-10-14 RX ADMIN — FOLIC ACID 1 MG: 1 TABLET ORAL at 09:04

## 2023-10-14 RX ADMIN — Medication 1 TABLET: at 09:04

## 2023-10-14 RX ADMIN — Medication 100 MG: at 09:04

## 2023-10-14 RX ADMIN — ENOXAPARIN SODIUM 30 MG: 100 INJECTION SUBCUTANEOUS at 09:04

## 2023-10-14 RX ADMIN — IPRATROPIUM BROMIDE AND ALBUTEROL SULFATE 1 DOSE: 2.5; .5 SOLUTION RESPIRATORY (INHALATION) at 12:06

## 2023-10-14 RX ADMIN — IPRATROPIUM BROMIDE AND ALBUTEROL SULFATE 1 DOSE: 2.5; .5 SOLUTION RESPIRATORY (INHALATION) at 08:35

## 2023-10-14 RX ADMIN — AMLODIPINE BESYLATE 10 MG: 10 TABLET ORAL at 09:04

## 2023-10-14 RX ADMIN — FAMOTIDINE 20 MG: 20 TABLET, FILM COATED ORAL at 09:04

## 2023-10-14 RX ADMIN — BUDESONIDE AND FORMOTEROL FUMARATE DIHYDRATE 2 PUFF: 160; 4.5 AEROSOL RESPIRATORY (INHALATION) at 08:35

## 2023-10-14 RX ADMIN — SODIUM CHLORIDE, PRESERVATIVE FREE 10 ML: 5 INJECTION INTRAVENOUS at 09:58

## 2023-10-14 NOTE — PLAN OF CARE
Patient was evaluated today for the diagnosis of COPD. I entered a DME order for home oxygen at 4 lpm because the diagnosis and testing require the patient to have supplemental oxygen. Condition will improve or be benefited by oxygen use. The patient is  able to perform good mobility in a home setting and therefore does require the use of a portable oxygen system. The need for this equipment was discussed with the patient and he understands and is in agreement.

## 2023-10-14 NOTE — DISCHARGE INSTRUCTIONS
-Please follow up with your PCP in one week or less. If you can not follow with your pcp please establish with one at the [facility] internal medicine clinic  -Please follow up with the cardiologist of your choice. One is listed above but you may choose any you like. Please call them for an appointment and discuss with your PCP if you need any assistance  -Please follow up with the Pulmonologist of your choice. One is listed above but you may choose any you like. Please call them for an appointment and discuss with your PCP if you need any assistance  -You require a stress test. Please follow up with your PCP for this and the Cardiologist you choose. -Please take medications as written  -Oxygen through [address] [facility] [phone] when you get home.

## 2023-10-14 NOTE — PROGRESS NOTES
[address]  Internal Medicine Teaching Residency Program  Inpatient Daily Progress Note  ______________________________________________________________________________    Patient: Joy Reeves  YOB: 1960   [facility]:[id number]    Acct: [de-identified]     Room: [facility]  Admit date: 10/9/2023  Today's date: 10/14/23  Number of days in the hospital: 5    SUBJECTIVE   Admitting Diagnosis: Acute exacerbation of chronic obstructive pulmonary disease (COPD) ([address])  CC: SOB and fatigue    Pt examined at bedside. Chart & results reviewed. Seen and examined at bedside. Lab reviewed. Patient used the BIPAP overnight( IPAP/EPAP 12/6 FiO2 - 40 %). Echo showed EF 55-60%. Currently, cardiology has stopped the heparin drip. Awaiting discharge. Plan to f/u OP with pulmonology for the need of BiPAP. ROS:  Constitutional:  negative for chills, fevers, sweats  Respiratory:  negative for cough,  hemoptysis, wheezing  Cardiovascular:  negative for chest pain, chest pressure/discomfort, lower extremity edema, palpitations  Gastrointestinal:  negative for abdominal pain, constipation, diarrhea, nausea, vomiting  Neurological:  negative for dizziness, headache  BRIEF HISTORY      The patient is a 58 y.o. male with past medical history significant for HTN and COPD. Patient came to [facility] due to concerns of SOB and fatigue. Patient was desaturating on the room air. He was given multiple breathing treatments Solu-Medrol 125 mg IV, azithromycin, Rocephin, budesonide inhaler and subsequently intubated due to respiratory compromise. This patient was initially admitted to the ICU at the outlying facility, however due to worsening blood gases was transferred to [facility]. His respiratory panel was positive for Rhino/enterovirus. At [facility], patient gradually weaned from the ventilator. Extubated 10/10 with no subsequent decompensation.  Currently saturating well on

## 2023-10-14 NOTE — PROGRESS NOTES
Discharge AVS reviewed with patient. Patient educated when to take next dose of meds, when to attend follow up appointments. Patient educated on how to use home o2 and set to correct rate. Patient verbalizes understanding. Peripheral IV removed, dressing applied. All medications sent with patient. Patient transported to main entrance via wheelchair with all belongings.

## 2023-10-14 NOTE — CARE COORDINATION
Transitional Planning  Spoke with Dr. Marcie Dominique, patient plans on being discharged today, will need home oxygen and bipap. Patient had home 02 eval and qualifies. 1030 am Called [facility], spoke with Tiffany Solomon, inquired what needed to be on order for bipap. Per Tiffany Solomon, bipap will have to be ordered and may take a week or longer to get it. Insurance has to be verified. On order bipap settings need to be listed along with equipment and supplies. Patient will need a sleep study per Tiffany Solomon. PS Dr. Marcie Dominique, asks if sleep study is still needed if bipap is not for sleep apnea, but for COPD and hypercapnic respiratory failure. Called Tiffany Solomon to verify, states that usually a sleep study is needed. Listed dx for Tiffany Solomon of COPD and hypercapnic respiratory failure. States that MD could order a non invasive ventilator. CM would have to fill out form to order it. Spoke with Dr. Marcie Dominique, states that he wants patient to go home on bipap and not a non invasive ventilator. States that bipap is working for patient. Dr. Marcie Dominique will speak with Dr. Berhane Whitney regarding bipap and patient can follow up with PCP. Order for oxygen, face to face, face sheet, O2 study and H and P faxed to [facility] [place], [phone].      1105 am Portable tank brought to room. Instructed patient to call once home so he can have oxygen delivered. Update given to [facility].      Discharge  [address] [facility] [address]  Clinical Case Management Department  Written by: Ritesh Mchugh RN    Patient Name: Sushant Lopez  Attending Provider: No att. providers found  Admit Date: 10/9/2023  6:00 AM  MRN: [id number]  Account: [de-identified]                     : 1960  Discharge Date: 10/14/2023      Disposition: home with home O2 from [address], RN

## 2023-10-14 NOTE — PROGRESS NOTES
Pt could not pay for meds- $46.88 was owed  Pharmacy had changed Symbicort to Advair via phone-   CLINICAL PHARMACY NOTE: MEDS TO BEDS    Total # of Prescriptions Filled: 8   The following medications were delivered to the patient:  Aspirin  Advair  Atorvastain  Theram  Thiamin  Folic acid  Prdnisone  amlodipine    Additional Documentation:

## 2023-10-14 NOTE — PLAN OF CARE
Problem: Discharge Planning  Goal: Discharge to home or other facility with appropriate resources  Outcome: Progressing  Flowsheets (Taken 10/13/2023 2000)  Discharge to home or other facility with appropriate resources: Identify barriers to discharge with patient and caregiver     Problem: Safety - Adult  Goal: Free from fall injury  10/14/2023 0436 by Anita Dang RN  Outcome: Progressing  10/13/2023 1847 by Ilana Monson RN  Outcome: Progressing     Problem: Pain  Goal: Verbalizes/displays adequate comfort level or baseline comfort level  Outcome: Progressing     Problem: Skin/Tissue Integrity  Goal: Absence of new skin breakdown  Description: 1. Monitor for areas of redness and/or skin breakdown  2. Assess vascular access sites hourly  3. Every 4-6 hours minimum:  Change oxygen saturation probe site  4. Every 4-6 hours:  If on nasal continuous positive airway pressure, respiratory therapy assess nares and determine need for appliance change or resting period.   Outcome: Progressing     Problem: Respiratory - Adult  Goal: Achieves optimal ventilation and oxygenation  Outcome: Progressing